# Patient Record
Sex: MALE | Race: WHITE | NOT HISPANIC OR LATINO | URBAN - NONMETROPOLITAN AREA
[De-identification: names, ages, dates, MRNs, and addresses within clinical notes are randomized per-mention and may not be internally consistent; named-entity substitution may affect disease eponyms.]

---

## 2023-05-04 PROBLEM — R42 VERTIGO: Status: ACTIVE | Noted: 2023-05-04

## 2023-05-04 PROBLEM — E78.2 MIXED HYPERLIPIDEMIA: Status: ACTIVE | Noted: 2023-05-04

## 2023-05-04 PROBLEM — I27.20 PULMONARY HYPERTENSION (MULTI): Status: ACTIVE | Noted: 2023-05-04

## 2023-05-04 PROBLEM — I51.89 GRADE I DIASTOLIC DYSFUNCTION: Status: ACTIVE | Noted: 2023-05-04

## 2023-05-04 PROBLEM — D86.0 PULMONARY SARCOIDOSIS (MULTI): Status: ACTIVE | Noted: 2023-05-04

## 2023-05-04 PROBLEM — K21.9 GASTROESOPHAGEAL REFLUX DISEASE WITHOUT ESOPHAGITIS: Status: ACTIVE | Noted: 2023-05-04

## 2023-05-04 RX ORDER — MECLIZINE HYDROCHLORIDE 25 MG/1
25 TABLET ORAL 2 TIMES DAILY PRN
COMMUNITY
End: 2023-05-05 | Stop reason: WASHOUT

## 2023-05-04 RX ORDER — ROSUVASTATIN CALCIUM 20 MG/1
40 TABLET, COATED ORAL DAILY
COMMUNITY
End: 2024-02-12 | Stop reason: WASHOUT

## 2023-05-04 RX ORDER — OMEPRAZOLE 20 MG/1
20 TABLET, DELAYED RELEASE ORAL
COMMUNITY

## 2023-05-04 RX ORDER — PROPRANOLOL HYDROCHLORIDE 20 MG/1
20 TABLET ORAL 2 TIMES DAILY
COMMUNITY
End: 2023-05-05 | Stop reason: WASHOUT

## 2023-05-05 ENCOUNTER — OFFICE VISIT (OUTPATIENT)
Dept: PRIMARY CARE | Facility: CLINIC | Age: 73
End: 2023-05-05
Payer: MEDICARE

## 2023-05-05 VITALS
TEMPERATURE: 98.6 F | HEIGHT: 74 IN | HEART RATE: 88 BPM | BODY MASS INDEX: 31.34 KG/M2 | DIASTOLIC BLOOD PRESSURE: 80 MMHG | WEIGHT: 244.2 LBS | SYSTOLIC BLOOD PRESSURE: 132 MMHG | OXYGEN SATURATION: 94 %

## 2023-05-05 DIAGNOSIS — I51.89 GRADE I DIASTOLIC DYSFUNCTION: Primary | ICD-10-CM

## 2023-05-05 DIAGNOSIS — R60.0 PEDAL EDEMA: ICD-10-CM

## 2023-05-05 PROCEDURE — 1036F TOBACCO NON-USER: CPT | Performed by: PHYSICIAN ASSISTANT

## 2023-05-05 PROCEDURE — 99214 OFFICE O/P EST MOD 30 MIN: CPT | Performed by: PHYSICIAN ASSISTANT

## 2023-05-05 PROCEDURE — 1159F MED LIST DOCD IN RCRD: CPT | Performed by: PHYSICIAN ASSISTANT

## 2023-05-05 PROCEDURE — 1160F RVW MEDS BY RX/DR IN RCRD: CPT | Performed by: PHYSICIAN ASSISTANT

## 2023-05-05 RX ORDER — HYDROCHLOROTHIAZIDE 12.5 MG/1
12.5 TABLET ORAL DAILY
Qty: 90 TABLET | Refills: 1 | Status: SHIPPED | OUTPATIENT
Start: 2023-05-05 | End: 2024-02-12 | Stop reason: WASHOUT

## 2023-05-05 RX ORDER — HYDROCHLOROTHIAZIDE 12.5 MG/1
12.5 TABLET ORAL DAILY
Qty: 30 TABLET | Refills: 0 | Status: SHIPPED | OUTPATIENT
Start: 2023-05-05 | End: 2023-05-05 | Stop reason: ENTERED-IN-ERROR

## 2023-05-05 ASSESSMENT — PATIENT HEALTH QUESTIONNAIRE - PHQ9
2. FEELING DOWN, DEPRESSED OR HOPELESS: NOT AT ALL
SUM OF ALL RESPONSES TO PHQ9 QUESTIONS 1 AND 2: 0
1. LITTLE INTEREST OR PLEASURE IN DOING THINGS: NOT AT ALL

## 2023-05-05 NOTE — PATIENT INSTRUCTIONS
Please follow up here in 3 months.     I will request records from [facility] for the labs and Pulm/Onc visits since September to review. Please keep an eye on your blood pressure and how you're feeling. If you feel dizzy or light headed, please stop the new medications hydrochlorothiazide and call the office for a visit and so we can check your BP and leg swelling    Prescriptions were sent to the pharmacy, please make sure to pick them up and call if there are any issues or questions.     Thank you for trusting me to be a part of your healthcare.    Take care! Aileen Downey PA-C

## 2023-05-05 NOTE — PROGRESS NOTES
Subjective   Patient ID: Abhilash Escamilla is a 73 y.o. male who presents for Edema (Leg swelling-bilateral ).    HPI Abhilash Escamilla is a 73 y.o. year old male patient with presenting to clinic with concern for   Chief Complaint   Patient presents with    Edema     Leg swelling-bilateral        Episodic vertigo , but rare and mild. Pt did not have relief from meclizine and saw ENT. Had relief with vertigo therapy and resolution last year.    Has noted mild pedal edema since last visit.  Also noted by his pulmonologist. Advised to wear compression stockings. Pt has noted some edema in his toes recently especially if he is not wearing the stockings.    He is compliant with his nightly oxygen but is doing much better than our last encounter and recently had a sleep study done in [place] to see if he can go off of home O2 at bedtime. His pulm surgeon no longer needs to monitor him and he is still following up with his oncology team from the tumor in his L upper lobe- PT states it was a cancerous lesion that was removed but he did not require chemo or radiation. Is being monitored by oncology team at [facility] and just had a CT chest within the past few weeks by Onc that showed no changes.    Patient Active Problem List   Diagnosis    Vertigo    Gastroesophageal reflux disease without esophagitis    Mixed hyperlipidemia    Pulmonary sarcoidosis (CMS/HCC)    Grade I diastolic dysfunction    Pulmonary hypertension (CMS/HCC)       Current Outpatient Medications:     omeprazole OTC (PriLOSEC OTC) 20 mg EC tablet, Take 1 tablet (20 mg) by mouth once daily in the morning. Take before meals. Do not crush, chew, or split., Disp: , Rfl:     oxygen (O2) therapy, Inhale 2 L/min at 120,000 mL/hr once daily at bedtime. via nasal canula  Sleep study shows hypoxia 88% overnight without sleep apnea. PFT no response to bronchodilator., Disp: , Rfl:     rosuvastatin (Crestor) 20 mg tablet, Take 2 tablets (40 mg) by mouth once daily., Disp: ,  "Rfl:     hydroCHLOROthiazide (HYDRODiuril) 12.5 mg tablet, Take 1 tablet (12.5 mg) by mouth once daily., Disp: 90 tablet, Rfl: 1  (hydrochlorothiazide added at today's visit)    Outside records reviewed at length- 51 pages reviewed in legacy EMR- documented key findings in problem list with page numbers.     Review of Systems  Review of Systems:   Constitutional: Denies fever  HEENT: Denies ST, earache  CVS: Denies Chest pain  Pulmonary: Denies wheezing, SOB  GI: Denies N/V  : Denies dysuria  Musculoskeletal:  Denies myalgia  Neuro: Denies focal weakness or numbness.  Skin: Denies Rashes.  *Review of Systems is negative unless otherwise mentioned in HPI or ROS above.    Objective   /80   Pulse 88   Temp 37 °C (98.6 °F)   Ht 1.88 m (6' 2\")   Wt 111 kg (244 lb 3.2 oz)   SpO2 94%   BMI 31.35 kg/m²     Physical Exam  Physical exam:  /80   Pulse 88   Temp 37 °C (98.6 °F)   Ht 1.88 m (6' 2\")   Wt 111 kg (244 lb 3.2 oz)   SpO2 94%   BMI 31.35 kg/m²  reviewed   Body mass index is 31.35 kg/m².     Constitutional: NAD.  Resting comfortably.  Head: Atraumatic, normocephalic.  ENT: moist oral mucosa. Posterior oropharynx nonerythematous with scant clear posterior pharyngeal streaking.  TM: Bilat TM nonerythematous, pearly grey, landmarks intact. EAC moderate non impacted cerumen.  Eyes: PERRLA. EOM intact.   Cardiac: Regular rate & rhythm. Mild diastolic murmur heard.  Pulmonary: Lungs clear bilat  GI: Soft, Nontender, nondistended.   Musculoskeletal: 1+ pitting bilat peripheral edema below knee bilat. DP and PT pulses intact bilat. Prominent varicosities bilat. No calf tenderness. Bilat neg subhash's sign. No cords.  Skin: No evidence of trauma. No rashes  Psych: Intact judgement and insight.    Assessment/Plan   Problem List Items Addressed This Visit       Grade I diastolic dysfunction - Primary    Relevant Medications    hydroCHLOROthiazide (HYDRODiuril) 12.5 mg tablet     Other Visit Diagnoses       " Pedal edema        Relevant Medications    hydroCHLOROthiazide (HYDRODiuril) 12.5 mg tablet

## 2023-07-17 ENCOUNTER — OFFICE VISIT (OUTPATIENT)
Dept: PRIMARY CARE | Facility: CLINIC | Age: 73
End: 2023-07-17
Payer: MEDICARE

## 2023-07-17 VITALS
TEMPERATURE: 98.6 F | HEIGHT: 74 IN | BODY MASS INDEX: 31.08 KG/M2 | WEIGHT: 242.2 LBS | DIASTOLIC BLOOD PRESSURE: 74 MMHG | SYSTOLIC BLOOD PRESSURE: 126 MMHG | HEART RATE: 88 BPM | OXYGEN SATURATION: 95 %

## 2023-07-17 DIAGNOSIS — E78.5 BORDERLINE HYPERLIPIDEMIA: ICD-10-CM

## 2023-07-17 DIAGNOSIS — Z12.5 SCREENING FOR PROSTATE CANCER: ICD-10-CM

## 2023-07-17 DIAGNOSIS — D86.0 PULMONARY SARCOIDOSIS (MULTI): ICD-10-CM

## 2023-07-17 DIAGNOSIS — Z00.00 ROUTINE GENERAL MEDICAL EXAMINATION AT A HEALTH CARE FACILITY: Primary | ICD-10-CM

## 2023-07-17 DIAGNOSIS — Z00.00 ROUTINE GENERAL MEDICAL EXAMINATION AT HEALTH CARE FACILITY: ICD-10-CM

## 2023-07-17 DIAGNOSIS — I27.20 PULMONARY HYPERTENSION (MULTI): ICD-10-CM

## 2023-07-17 DIAGNOSIS — E55.9 VITAMIN D INSUFFICIENCY: ICD-10-CM

## 2023-07-17 DIAGNOSIS — Z79.899 ON STATIN THERAPY: ICD-10-CM

## 2023-07-17 DIAGNOSIS — Z79.899 LONG-TERM CURRENT USE OF PROTON PUMP INHIBITOR THERAPY: ICD-10-CM

## 2023-07-17 PROCEDURE — G0439 PPPS, SUBSEQ VISIT: HCPCS | Performed by: PHYSICIAN ASSISTANT

## 2023-07-17 PROCEDURE — 1036F TOBACCO NON-USER: CPT | Performed by: PHYSICIAN ASSISTANT

## 2023-07-17 PROCEDURE — 1159F MED LIST DOCD IN RCRD: CPT | Performed by: PHYSICIAN ASSISTANT

## 2023-07-17 PROCEDURE — 1160F RVW MEDS BY RX/DR IN RCRD: CPT | Performed by: PHYSICIAN ASSISTANT

## 2023-07-17 PROCEDURE — 1170F FXNL STATUS ASSESSED: CPT | Performed by: PHYSICIAN ASSISTANT

## 2023-07-17 ASSESSMENT — ACTIVITIES OF DAILY LIVING (ADL)
TAKING_MEDICATION: INDEPENDENT
DRESSING: INDEPENDENT
MANAGING_FINANCES: INDEPENDENT
BATHING: INDEPENDENT
GROCERY_SHOPPING: INDEPENDENT
DOING_HOUSEWORK: INDEPENDENT

## 2023-07-17 NOTE — PROGRESS NOTES
Subjective     HPI   Abhilash Escamilla is a 73 y.o. year old male patient with presenting to clinic with concern for   Chief Complaint   Patient presents with    Medicare Annual Wellness Visit Subsequent       Mr Escamilla presents to clinic for Medicare annual.     CT chest scans done through [facility]- treated for lung ca 4/25/23  Aged out of colonoscopy  Annual labs due including PSA and lipids    Last PSA was August 2023 at [facility]      Previous visit:     Has noted mild pedal edema since last visit.  Also noted by his pulmonologist. Advised to wear compression stockings. Pt has noted some edema in his toes recently especially if he is not wearing the stockings. Please keep an eye on your blood pressure and how you're feeling. If you feel dizzy or light headed, please stop the new medications hydrochlorothiazide and call the office for a visit and so we can check your BP and leg swelling     Hx Episodic vertigo , but rare and mild. Pt did not have relief from meclizine and saw ENT. Had relief with vertigo therapy and resolution last year.     He is compliant with his nightly oxygen but is doing much better than our last encounter and recently had a sleep study done in [place] to see if he can go off of home O2 at bedtime. His pulm surgeon no longer needs to monitor him and he is still following up with his oncology team from the tumor in his L upper lobe- PT states it was a cancerous lesion that was removed but he did not require chemo or radiation. Is being monitored by oncology team at [facility] and just had a CT chest within the past few weeks by Onc that showed no changes.    Patient Active Problem List   Diagnosis    Vertigo    Gastroesophageal reflux disease without esophagitis    Mixed hyperlipidemia    Pulmonary sarcoidosis (CMS/HCC)    Grade I diastolic dysfunction    Pulmonary hypertension (CMS/HCC)       Past Medical History:   Diagnosis Date    Genital herpes       Past Surgical History:   Procedure Laterality Date     "CHOLECYSTECTOMY      CT LUNG MEDIASTINUM BIOPSY Left 07/22/2022    [place]- unknown result- sarcoidosis vs malignancy    LUNG LOBECTOMY Left     VATS and BRYCE resection 8/2022      Family History   Problem Relation Name Age of Onset    Breast cancer Parent        Social History     Tobacco Use    Smoking status: Former     Packs/day: 1.00     Years: 16.00     Total pack years: 16.00     Types: Cigarettes    Smokeless tobacco: Never   Substance Use Topics    Alcohol use: Not Currently     Alcohol/week: 5.0 standard drinks of alcohol     Types: 5 Standard drinks or equivalent per week     Comment: a few oz liquor every other day        Current Outpatient Medications:     hydroCHLOROthiazide (HYDRODiuril) 12.5 mg tablet, Take 1 tablet (12.5 mg) by mouth once daily., Disp: 90 tablet, Rfl: 1    omeprazole OTC (PriLOSEC OTC) 20 mg EC tablet, Take 1 tablet (20 mg) by mouth once daily in the morning. Take before meals. Do not crush, chew, or split., Disp: , Rfl:     oxygen (O2) therapy, Inhale 2 L/min at 120,000 mL/hr once daily at bedtime. via nasal canula  Sleep study shows hypoxia 88% overnight without sleep apnea. PFT no response to bronchodilator., Disp: , Rfl:     rosuvastatin (Crestor) 20 mg tablet, Take 2 tablets (40 mg) by mouth once daily., Disp: , Rfl:      Review of Systems  Review of Systems:   Constitutional: Denies fever  HEENT: Denies ST, earache  CVS: Denies Chest pain  Pulmonary: Denies wheezing, SOB  GI: Denies N/V  : Denies dysuria  Musculoskeletal:  Denies myalgia  Neuro: Denies focal weakness or numbness.  Skin: Denies Rashes.  *Review of Systems is negative unless otherwise mentioned in HPI or ROS above.    Objective   /50   Pulse 88   Temp 37 °C (98.6 °F)   Ht 1.88 m (6' 2\")   Wt 110 kg (242 lb 3.2 oz)   SpO2 95%   BMI 31.10 kg/m²     Physical Exam  Physical exam:  /50   Pulse 88   Temp 37 °C (98.6 °F)   Ht 1.88 m (6' 2\")   Wt 110 kg (242 lb 3.2 oz)   SpO2 95%   BMI 31.10 " kg/m²  reviewed   Body mass index is 31.1 kg/m².   Constitutional: NAD.  Resting comfortably.  Head: Atraumatic, normocephalic.  ENT: moist oral mucosa. Nasal mucosa mildly edematous and nonerythematous. Posterior oropharynx nonerythematous with mild clear posterior pharyngeal streaking.  TM: Bilat TM nonerythematous, pearly grey, landmarks intact. EAC wnl bilat.  Eyes: PERRLA. EOM intact.   Lymph: No anterior cervical chain lymphadenopathy or submandibular lymphadenopathy.   Cardiac: Regular rate & rhythm.   Pulmonary: Lungs clear bilat  GI: Soft, Nontender, nondistended.   Musculoskeletal: 1+ peripheral edema.   Skin: No evidence of trauma. No rashes  Psych: Intact judgement and insight.    .Assessment/Plan   Problem List Items Addressed This Visit       Pulmonary sarcoidosis (CMS/HCC)    Pulmonary hypertension (CMS/HCC)     Other Visit Diagnoses       Routine general medical examination at a health care facility    -  Primary    Borderline hyperlipidemia        Vitamin D insufficiency        Long-term current use of proton pump inhibitor therapy        On statin therapy

## 2023-08-07 ENCOUNTER — APPOINTMENT (OUTPATIENT)
Dept: PRIMARY CARE | Facility: CLINIC | Age: 73
End: 2023-08-07
Payer: MEDICARE

## 2023-08-28 ENCOUNTER — LAB (OUTPATIENT)
Dept: LAB | Facility: LAB | Age: 73
End: 2023-08-28
Payer: MEDICARE

## 2023-08-28 DIAGNOSIS — Z12.5 SCREENING FOR PROSTATE CANCER: ICD-10-CM

## 2023-08-28 DIAGNOSIS — E55.9 VITAMIN D INSUFFICIENCY: ICD-10-CM

## 2023-08-28 DIAGNOSIS — Z79.899 ON STATIN THERAPY: ICD-10-CM

## 2023-08-28 DIAGNOSIS — Z79.899 LONG-TERM CURRENT USE OF PROTON PUMP INHIBITOR THERAPY: ICD-10-CM

## 2023-08-28 DIAGNOSIS — E78.5 BORDERLINE HYPERLIPIDEMIA: ICD-10-CM

## 2023-08-28 DIAGNOSIS — Z00.00 ROUTINE GENERAL MEDICAL EXAMINATION AT A HEALTH CARE FACILITY: ICD-10-CM

## 2023-08-28 LAB
ALANINE AMINOTRANSFERASE (SGPT) (U/L) IN SER/PLAS: 15 U/L (ref 10–52)
ALBUMIN (G/DL) IN SER/PLAS: 4 G/DL (ref 3.4–5)
ALKALINE PHOSPHATASE (U/L) IN SER/PLAS: 94 U/L (ref 33–136)
ANION GAP IN SER/PLAS: 12 MMOL/L (ref 10–20)
ASPARTATE AMINOTRANSFERASE (SGOT) (U/L) IN SER/PLAS: 16 U/L (ref 9–39)
BILIRUBIN TOTAL (MG/DL) IN SER/PLAS: 1.4 MG/DL (ref 0–1.2)
CALCIDIOL (25 OH VITAMIN D3) (NG/ML) IN SER/PLAS: 25 NG/ML
CALCIUM (MG/DL) IN SER/PLAS: 8.8 MG/DL (ref 8.6–10.3)
CARBON DIOXIDE, TOTAL (MMOL/L) IN SER/PLAS: 27 MMOL/L (ref 21–32)
CHLORIDE (MMOL/L) IN SER/PLAS: 107 MMOL/L (ref 98–107)
CHOLESTEROL (MG/DL) IN SER/PLAS: 191 MG/DL (ref 0–199)
CHOLESTEROL IN HDL (MG/DL) IN SER/PLAS: 45.8 MG/DL
CHOLESTEROL/HDL RATIO: 4.2
CREATINE KINASE (U/L) IN SER/PLAS: 35 U/L (ref 0–325)
CREATININE (MG/DL) IN SER/PLAS: 1.18 MG/DL (ref 0.5–1.3)
ERYTHROCYTE DISTRIBUTION WIDTH (RATIO) BY AUTOMATED COUNT: 13.2 % (ref 11.5–14.5)
ERYTHROCYTE MEAN CORPUSCULAR HEMOGLOBIN CONCENTRATION (G/DL) BY AUTOMATED: 33 G/DL (ref 32–36)
ERYTHROCYTE MEAN CORPUSCULAR VOLUME (FL) BY AUTOMATED COUNT: 94 FL (ref 80–100)
ERYTHROCYTES (10*6/UL) IN BLOOD BY AUTOMATED COUNT: 5.03 X10E12/L (ref 4.5–5.9)
GFR MALE: 64 ML/MIN/1.73M2
GLUCOSE (MG/DL) IN SER/PLAS: 94 MG/DL (ref 74–99)
HEMATOCRIT (%) IN BLOOD BY AUTOMATED COUNT: 47.3 % (ref 41–52)
HEMOGLOBIN (G/DL) IN BLOOD: 15.6 G/DL (ref 13.5–17.5)
LDL: 122 MG/DL (ref 0–99)
LEUKOCYTES (10*3/UL) IN BLOOD BY AUTOMATED COUNT: 5 X10E9/L (ref 4.4–11.3)
MAGNESIUM (MG/DL) IN SER/PLAS: 1.77 MG/DL (ref 1.6–2.4)
PLATELETS (10*3/UL) IN BLOOD AUTOMATED COUNT: 268 X10E9/L (ref 150–450)
POTASSIUM (MMOL/L) IN SER/PLAS: 4.2 MMOL/L (ref 3.5–5.3)
PROSTATE SPECIFIC ANTIGEN,SCREEN: 0.86 NG/ML (ref 0–4)
PROTEIN TOTAL: 5.9 G/DL (ref 6.4–8.2)
SODIUM (MMOL/L) IN SER/PLAS: 142 MMOL/L (ref 136–145)
THYROTROPIN (MIU/L) IN SER/PLAS BY DETECTION LIMIT <= 0.05 MIU/L: 1.9 MIU/L (ref 0.44–3.98)
TRIGLYCERIDE (MG/DL) IN SER/PLAS: 117 MG/DL (ref 0–149)
UREA NITROGEN (MG/DL) IN SER/PLAS: 16 MG/DL (ref 6–23)
VLDL: 23 MG/DL (ref 0–40)

## 2023-08-28 PROCEDURE — 84443 ASSAY THYROID STIM HORMONE: CPT

## 2023-08-28 PROCEDURE — 83735 ASSAY OF MAGNESIUM: CPT

## 2023-08-28 PROCEDURE — 82550 ASSAY OF CK (CPK): CPT

## 2023-08-28 PROCEDURE — 85027 COMPLETE CBC AUTOMATED: CPT

## 2023-08-28 PROCEDURE — 80061 LIPID PANEL: CPT

## 2023-08-28 PROCEDURE — 80053 COMPREHEN METABOLIC PANEL: CPT

## 2023-08-28 PROCEDURE — 36415 COLL VENOUS BLD VENIPUNCTURE: CPT

## 2023-08-28 PROCEDURE — G0103 PSA SCREENING: HCPCS

## 2023-08-28 PROCEDURE — 82306 VITAMIN D 25 HYDROXY: CPT

## 2023-09-01 DIAGNOSIS — E80.6 BILIRUBINEMIA: Primary | ICD-10-CM

## 2023-09-01 DIAGNOSIS — R79.89 LOW VITAMIN D LEVEL: ICD-10-CM

## 2023-09-01 RX ORDER — CHOLECALCIFEROL (VITAMIN D3) 1250 MCG
50000 TABLET ORAL
Qty: 6 TABLET | Refills: 0 | Status: SHIPPED | OUTPATIENT
Start: 2023-09-01 | End: 2023-09-05 | Stop reason: ENTERED-IN-ERROR

## 2023-09-05 DIAGNOSIS — E55.9 VITAMIN D INSUFFICIENCY: Primary | ICD-10-CM

## 2023-09-05 RX ORDER — ASPIRIN 325 MG
50000 TABLET, DELAYED RELEASE (ENTERIC COATED) ORAL
Qty: 6 CAPSULE | Refills: 0 | Status: SHIPPED | OUTPATIENT
Start: 2023-09-05 | End: 2023-10-11

## 2023-11-07 ENCOUNTER — OFFICE VISIT (OUTPATIENT)
Dept: PRIMARY CARE | Facility: CLINIC | Age: 73
End: 2023-11-07
Payer: MEDICARE

## 2023-11-07 VITALS
OXYGEN SATURATION: 94 % | TEMPERATURE: 97 F | DIASTOLIC BLOOD PRESSURE: 82 MMHG | BODY MASS INDEX: 31.18 KG/M2 | HEART RATE: 73 BPM | SYSTOLIC BLOOD PRESSURE: 124 MMHG | WEIGHT: 243 LBS | HEIGHT: 74 IN

## 2023-11-07 DIAGNOSIS — R52 PAIN: ICD-10-CM

## 2023-11-07 DIAGNOSIS — K21.9 GASTROESOPHAGEAL REFLUX DISEASE WITHOUT ESOPHAGITIS: ICD-10-CM

## 2023-11-07 DIAGNOSIS — I51.89 GRADE I DIASTOLIC DYSFUNCTION: Primary | ICD-10-CM

## 2023-11-07 PROCEDURE — 1036F TOBACCO NON-USER: CPT | Performed by: PHYSICIAN ASSISTANT

## 2023-11-07 PROCEDURE — 1159F MED LIST DOCD IN RCRD: CPT | Performed by: PHYSICIAN ASSISTANT

## 2023-11-07 PROCEDURE — 99213 OFFICE O/P EST LOW 20 MIN: CPT | Performed by: PHYSICIAN ASSISTANT

## 2023-11-07 PROCEDURE — 1160F RVW MEDS BY RX/DR IN RCRD: CPT | Performed by: PHYSICIAN ASSISTANT

## 2023-11-07 RX ORDER — IBUPROFEN 600 MG/1
1 TABLET ORAL 4 TIMES DAILY
COMMUNITY
Start: 2021-02-22 | End: 2023-11-07 | Stop reason: SDUPTHER

## 2023-11-07 RX ORDER — IBUPROFEN 600 MG/1
600 TABLET ORAL AS NEEDED
Qty: 90 TABLET | Refills: 0 | Status: SHIPPED | OUTPATIENT
Start: 2023-11-07 | End: 2024-02-12 | Stop reason: WASHOUT

## 2023-11-07 NOTE — PROGRESS NOTES
Subjective     HPI   Abhilash Escamilla is a 73 y.o. year old male patient with presenting to clinic with concern for   Chief Complaint   Patient presents with    Follow-up     No concerns         Has noted mild pedal edema since last visit.  Also noted by his pulmonologist. Advised to wear compression stockings. He notes this is  chronic and unchanged. He has CHF stage 1 diastolic. Takes hydrochlorothiazide 12.5mg. Has pulmonary hypertension also.   Feels well using O2 overnight only.     Reviewed labs recently done by [facility].    Vertigo has not recurred. Hx Episodic vertigo , but rare and mild. Pt did not have relief from meclizine and saw ENT. Had relief with vertigo therapy and resolution last year.     He is compliant with his nightly oxygen but is doing much better than our last encounter and recently had a sleep study done in [place] to see if he can go off of home O2 at bedtime. His pulm surgeon no longer needs to monitor him and he is still following up with his oncology team from the tumor in his L upper lobe- PT states it was a cancerous lesion that was removed but he did not require chemo or radiation. Is being monitored by oncology team at [facility] and just had a CT chest within the past few weeks by Onc that showed no changes.      Patient Active Problem List   Diagnosis    Vertigo    Gastroesophageal reflux disease without esophagitis    Mixed hyperlipidemia    Pulmonary sarcoidosis (CMS/HCC)    Grade I diastolic dysfunction    Pulmonary hypertension (CMS/HCC)       Past Medical History:   Diagnosis Date    Genital herpes       Past Surgical History:   Procedure Laterality Date    CHOLECYSTECTOMY      CT LUNG MEDIASTINUM BIOPSY Left 07/22/2022    [place]- unknown result- sarcoidosis vs malignancy    LUNG LOBECTOMY Left     VATS and BRYCE resection 8/2022      Family History   Problem Relation Name Age of Onset    Breast cancer Parent        Social History     Tobacco Use    Smoking status: Former      "Packs/day: 1.00     Years: 16.00     Additional pack years: 0.00     Total pack years: 16.00     Types: Cigarettes    Smokeless tobacco: Never   Substance Use Topics    Alcohol use: Not Currently     Alcohol/week: 5.0 standard drinks of alcohol     Types: 5 Standard drinks or equivalent per week     Comment: a few oz liquor every other day        Current Outpatient Medications:     omeprazole OTC (PriLOSEC OTC) 20 mg EC tablet, Take 1 tablet (20 mg) by mouth once daily in the morning. Take before meals. Do not crush, chew, or split., Disp: , Rfl:     oxygen (O2) therapy, Inhale 2 L/min at 120,000 mL/hr once daily at bedtime. via nasal canula  Sleep study shows hypoxia 88% overnight without sleep apnea. PFT no response to bronchodilator., Disp: , Rfl:     rosuvastatin (Crestor) 20 mg tablet, Take 2 tablets (40 mg) by mouth once daily., Disp: , Rfl:     hydroCHLOROthiazide (HYDRODiuril) 12.5 mg tablet, Take 1 tablet (12.5 mg) by mouth once daily., Disp: 90 tablet, Rfl: 1    ibuprofen 600 mg tablet, Take 1 tablet (600 mg) by mouth if needed for mild pain (1 - 3)., Disp: 90 tablet, Rfl: 0     Review of Systems  Constitutional: Denies fever  HEENT: Denies ST, earache  CVS: Denies Chest pain  Pulmonary: Denies wheezing, SOB  GI: Denies N/V  : Denies dysuria  Musculoskeletal:  Denies myalgia  Neuro: Denies focal weakness or numbness.  Skin: Denies Rashes.  *Review of Systems is negative unless otherwise mentioned in HPI or ROS above.    Objective   /82   Pulse 73   Temp 36.1 °C (97 °F)   Ht 1.88 m (6' 2\")   Wt 110 kg (243 lb)   SpO2 94%   BMI 31.20 kg/m²  reviewed Body mass index is 31.2 kg/m².     Physical Exam  Constitutional: NAD.  Resting comfortably.  Head: Atraumatic, normocephalic.  ENT: Moist oral mucosa. Nasal mucosa wnl.   Cardiac: Regular rate & rhythm.   Pulmonary: Lungs clear bilat  GI: Soft, Nontender, nondistended.   Musculoskeletal: 1+ pitting peripheral edema bilat  Skin: No evidence of " trauma. No rashes  Psych: Intact judgement and insight.    .Assessment/Plan   Problem List Items Addressed This Visit       Gastroesophageal reflux disease without esophagitis    Grade I diastolic dysfunction - Primary     Other Visit Diagnoses       Pain        Relevant Medications    ibuprofen 600 mg tablet          Continue hydrochlorothiazide for CHF diastolic dysfunction. Pt is doing well on omeprazole. Continue.   Pt is doing well on rosuvastatin also.     Discussed O2 therapy and previous sleep study- O2 remains >90 with supplemental O2 and no CPAP

## 2024-02-12 ENCOUNTER — TELEMEDICINE (OUTPATIENT)
Dept: PRIMARY CARE | Facility: CLINIC | Age: 74
End: 2024-02-12
Payer: MEDICARE

## 2024-02-12 DIAGNOSIS — U07.1 COVID-19: Primary | ICD-10-CM

## 2024-02-12 PROCEDURE — 1159F MED LIST DOCD IN RCRD: CPT | Performed by: PHYSICIAN ASSISTANT

## 2024-02-12 PROCEDURE — 1036F TOBACCO NON-USER: CPT | Performed by: PHYSICIAN ASSISTANT

## 2024-02-12 PROCEDURE — 99213 OFFICE O/P EST LOW 20 MIN: CPT | Performed by: PHYSICIAN ASSISTANT

## 2024-02-12 RX ORDER — ATORVASTATIN CALCIUM 40 MG/1
1 TABLET, FILM COATED ORAL DAILY
COMMUNITY
Start: 2021-02-22

## 2024-02-12 NOTE — PATIENT INSTRUCTIONS
We discussed Paxlovid and the possibility of Lagevrio if pt is not eligible for Paxlovid.   Medication is recommended after consideration of your COVID status and risk factors including age, medical history, immunization status, severity of disease being mild to moderate, and duration of illness. We discussed the proper use of this medication in helping prevent severe illness from developing secondary to COVID including requiring hospitalization and ICU admission. You may have comorbidities that would make them a good candidate for medication treatment of COVID including consideration of PAXLOVID or Lagevrio.    PAXLOVID is currently the only FDA approved oral medication to treat COVID-19. There are several known medication interactions with Paxlovid discussed. PAXLOVID is used at a lower dose for people with severe chronic kidney disease. I will review the medication list in detail. The most common side effects are upset stomach/diarrhea, odd taste    Lagevrio is currently under FDA Emergency Use Authorization as of October 2023 and risk vs benefit of PAXLOVID vs Lagevrio vs supportive care was considered as discussed. regarding options including antiviral infusions and/or monoclonal antibody therapy which are no longer recommended due to inefficacy. The most common side effects are upset stomach with nausea and diarrhea, dizziness and headache.         Please stay well hydrated. Practice deep breathing often, walking in the home throughout course of illness and DVT prevention, and symptom management with tylenol/motrin as needed (unless you've been told to avoid NSAIDs) and cough medication.   Go to ER if emergent/urgent symptoms occur such as chest pain, shortness of breath, leg swelling &/or pain, confusion, dizziness, etc    Isolate per CDC guidelines for COVID illness unless further medical care if needed. Isolation guidelines as of Oct 2023 are:  5 days isolation at home then you can return to activities on  day 6-10 (masked) if improving symptoms and fever-free for 24 hours.     Quarantine of people you've been in contact with is no longer recommended regardless of their vaccination status.      PAXLOVID fact sheet  https://labeling.[employer].com/ShowLabeling.aspx?sy=33287&format=pdf    Lagevrio fact sheet https://www.fda.gov/media/214940/download

## 2024-02-12 NOTE — PROGRESS NOTES
An interactive audio and video telecommunication system which permits real time communications between the patient (at the originating site) and provider (at the distant site) was utilized to provide this telehealth service.   Verbal consent was requested and obtained from Abhilash Escamilla on this date, 02/12/24 for a telehealth visit.     Subjective    Abhilash Escamilla is a 74 y.o. year old male patient presenting for virtual visit   Chief Complaint   Patient presents with    Cough        COVID + yesterday. Headache, chills, fatigue, malaise, mild diarrhea.    He is compliant with his nightly oxygen but is doing much better than our last encounter and recently had a sleep study done in [place] to see if he can go off of home O2 at bedtime. His pulm surgeon no longer needs to monitor him and he is still following up with his oncology team from the tumor in his L upper lobe- PT states it was a cancerous lesion that was removed but he did not require chemo or radiation. Is being monitored by oncology team at [facility] and just had a CT chest within the past few weeks by Onc that showed no changes.    GFR has been over 60 for at least 3 years. Reviewed records at [facility] also. On atorvastatin    Patient Active Problem List   Diagnosis    Vertigo    Gastroesophageal reflux disease without esophagitis    Mixed hyperlipidemia    Pulmonary sarcoidosis (CMS/HCC)    Grade I diastolic dysfunction    Pulmonary hypertension (CMS/HCC)       Past Medical History:   Diagnosis Date    Genital herpes       Past Surgical History:   Procedure Laterality Date    CHOLECYSTECTOMY      CT LUNG MEDIASTINUM BIOPSY Left 07/22/2022    [place]- unknown result- sarcoidosis vs malignancy    LUNG LOBECTOMY Left     VATS and BRYCE resection 8/2022      Family History   Problem Relation Name Age of Onset    Breast cancer Parent        Social History     Tobacco Use    Smoking status: Former     Packs/day: 1.00     Years: 16.00     Additional pack years: 0.00      Total pack years: 16.00     Types: Cigarettes    Smokeless tobacco: Never   Substance Use Topics    Alcohol use: Not Currently     Alcohol/week: 5.0 standard drinks of alcohol     Types: 5 Standard drinks or equivalent per week     Comment: a few oz liquor every other day        Current Outpatient Medications:     atorvastatin (Lipitor) 40 mg tablet, Take 1 tablet (40 mg) by mouth once daily., Disp: , Rfl:     nirmatrelvir-ritonavir (PAXLOVID) 300 mg (150 mg x 2)-100 mg tablet therapy pack, Take 3 tablets by mouth 2 times a day for 5 days. STOP ATORVASTATIN for 8 days, Disp: 30 tablet, Rfl: 0    omeprazole OTC (PriLOSEC OTC) 20 mg EC tablet, Take 1 tablet (20 mg) by mouth once daily in the morning. Take before meals. Do not crush, chew, or split., Disp: , Rfl:     oxygen (O2) therapy, Inhale 2 L/min at 120,000 mL/hr once daily at bedtime. via nasal canula  Sleep study shows hypoxia 88% overnight without sleep apnea. PFT no response to bronchodilator., Disp: , Rfl:      Review of Systems    Review of Systems:   Constitutional: Denies fever  HEENT: Denies ST, earache  CVS: Denies Chest pain  Pulmonary: Denies wheezing, SOB  GI: Denies N/V  : Denies dysuria  Musculoskeletal:  Denies myalgia  Neuro: Denies focal weakness or numbness.  Skin: Denies Rashes.  *Review of Systems is negative unless otherwise mentioned in HPI or ROS above.     Objective    VITALS  Pt does not have vitals available at time of visit.    Exam       Limited physical exam in virtual platform  Nontoxic. No acute distress.  Nonlabored breathing.    Assessment/Plan      Problem List Items Addressed This Visit    None  Visit Diagnoses       COVID-19    -  Primary    Relevant Medications    nirmatrelvir-ritonavir (PAXLOVID) 300 mg (150 mg x 2)-100 mg tablet therapy pack             DISCHARGE    Please schedule a follow up visit     Any prescriptions were sent to the pharmacy, please make sure to pick them up and call if there are any issues or  questions.     Thank you for trusting me to be a part of your healthcare.    Take care!     Aileen Downey PA-C  [facility]  [phone] [phone]     Please feel free to call the office, or return a message if you have any questions or concerns.

## 2024-03-12 ENCOUNTER — TELEPHONE (OUTPATIENT)
Dept: PRIMARY CARE | Facility: CLINIC | Age: 74
End: 2024-03-12
Payer: MEDICARE

## 2024-03-12 NOTE — TELEPHONE ENCOUNTER
Was told by his eye doctor that he has cataracts that may require surgery, do you know who he should follow up with for this?  Do you recommend anyone?

## 2024-03-28 ENCOUNTER — TELEPHONE (OUTPATIENT)
Dept: PRIMARY CARE | Facility: CLINIC | Age: 74
End: 2024-03-28
Payer: MEDICARE

## 2024-03-28 DIAGNOSIS — B35.1 ONYCHOMYCOSIS OF GREAT TOE: Primary | ICD-10-CM

## 2024-03-28 RX ORDER — CICLOPIROX 80 MG/ML
1 SOLUTION TOPICAL NIGHTLY
Qty: 6.6 ML | Refills: 5 | Status: SHIPPED | OUTPATIENT
Start: 2024-03-28 | End: 2024-09-24

## 2024-05-07 ENCOUNTER — OFFICE VISIT (OUTPATIENT)
Dept: PRIMARY CARE | Facility: CLINIC | Age: 74
End: 2024-05-07
Payer: MEDICARE

## 2024-05-07 VITALS
BODY MASS INDEX: 30.88 KG/M2 | WEIGHT: 240.6 LBS | OXYGEN SATURATION: 95 % | DIASTOLIC BLOOD PRESSURE: 62 MMHG | TEMPERATURE: 98.6 F | SYSTOLIC BLOOD PRESSURE: 132 MMHG | HEART RATE: 92 BPM | HEIGHT: 74 IN

## 2024-05-07 DIAGNOSIS — C34.92 NON-SMALL CELL CANCER OF LEFT LUNG (MULTI): ICD-10-CM

## 2024-05-07 DIAGNOSIS — I27.20 PULMONARY HYPERTENSION (MULTI): ICD-10-CM

## 2024-05-07 DIAGNOSIS — Z00.00 MEDICARE ANNUAL WELLNESS VISIT, SUBSEQUENT: Primary | ICD-10-CM

## 2024-05-07 DIAGNOSIS — D86.0 PULMONARY SARCOIDOSIS (MULTI): ICD-10-CM

## 2024-05-07 DIAGNOSIS — Z01.818 PREOPERATIVE EXAMINATION: ICD-10-CM

## 2024-05-07 PROCEDURE — 1160F RVW MEDS BY RX/DR IN RCRD: CPT | Performed by: PHYSICIAN ASSISTANT

## 2024-05-07 PROCEDURE — 1124F ACP DISCUSS-NO DSCNMKR DOCD: CPT | Performed by: PHYSICIAN ASSISTANT

## 2024-05-07 PROCEDURE — 1170F FXNL STATUS ASSESSED: CPT | Performed by: PHYSICIAN ASSISTANT

## 2024-05-07 PROCEDURE — G0439 PPPS, SUBSEQ VISIT: HCPCS | Performed by: PHYSICIAN ASSISTANT

## 2024-05-07 PROCEDURE — 1036F TOBACCO NON-USER: CPT | Performed by: PHYSICIAN ASSISTANT

## 2024-05-07 PROCEDURE — 1159F MED LIST DOCD IN RCRD: CPT | Performed by: PHYSICIAN ASSISTANT

## 2024-05-07 ASSESSMENT — PATIENT HEALTH QUESTIONNAIRE - PHQ9
2. FEELING DOWN, DEPRESSED OR HOPELESS: NOT AT ALL
1. LITTLE INTEREST OR PLEASURE IN DOING THINGS: NOT AT ALL
SUM OF ALL RESPONSES TO PHQ9 QUESTIONS 1 AND 2: 0

## 2024-05-07 ASSESSMENT — ACTIVITIES OF DAILY LIVING (ADL)
DOING_HOUSEWORK: INDEPENDENT
MANAGING_FINANCES: INDEPENDENT
BATHING: INDEPENDENT
DRESSING: INDEPENDENT
GROCERY_SHOPPING: INDEPENDENT
TAKING_MEDICATION: INDEPENDENT

## 2024-05-07 NOTE — PROGRESS NOTES
Subjective   HPI   Abhilash Escamilla is a 74 y.o. year old male patient with presenting to clinic with concern for Medicare Visit     Chief Complaint   Patient presents with    Medicare Annual Wellness Visit Subsequent    Procedure     Clearance. Cataract. Dr. Viktor Bedolla. [place]. Right on 05-13, Left on 05-20.       Medicare annual visit.  Also needs surgical clearance for cataract surgery    Advance Care Planning    Advance Care Planning was discussed with patientadvanced directives and POA and the patient's Advance Care Plan is documented in the EMR.    Labs reviewed 3/25/24 from Seymour Pritchett MD - 03/08/2024 10:10 AM EST   transbronchial biopsy diagnosis of sarcoidosis at [facility] 20 years ago. CT last year with new left upper lobe nodule than biopsy was consistent with squamous cell cancer. He was status post resection with Dr. Moseley. He notes some restriction but still able to do more slowly gaining strength and exercising. CT scan done today prior to his visit by my review shows no change in his nodules final report pending from cardiology. Left hilum seems to be better expanded     74-year-old male referral from Dr. Haro for lung mass.  Followed by pulmonary for several years for sarcoidosis with yearly CT imaging.   CT chest 5/4/22 - mediastinal and hilar adenopathy mostly calcified and is stable, BRYCE mass 3.7 x 2.1 x 2.1 cm.    PET CT 6/15/22 - maximum SUV in lymph nodes is SUV 4.2 in paraesophageal LN and SUV 4.4 in right perihilum, SUV of the BRYCE mass is 2.7, gastrohepatic LN with SUV 3.9.     Brain MRI 6/15/22 negative for metastatic lesions.     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 Procedure c/b pneumothorax managed with pigtail placement.     PFT's from 2020 showed FEV1 of 3.18 L, 98%; DLCO 65%.    The patient is fully funcitonal.     PMHx/PSHx/Meds: Sarcoidosis, BMI 30, lap ирина, hip replacement, knee  replacement, 23 pack/year smoker quit 30 years ago, 1 ETOH drink a day.     Diagnosis ICD-10-CM Plan  1. Preop exam for internal medicine Z01.818    2. Medication monitoring encounter Z51.81    3. Hyperglycemia R73.9    4. Vitamin D deficiency E55.9    5. Mixed hyperlipidemia E78.2    6. Benign essential tremor G25.0    7. S/P TKR (total knee replacement), right Z96.651    8. Sarcoidosis D86.9    9. Benign positional vertigo, right H81.11    10. Non-small cell cancer of left lung (HCC) C34.92    11. Edema, unspecified type R60.9 (1+ pitting bilat KG_)    12. Stage 3a chronic kidney disease (HCC) N18.31    13. Age-related cataract of both eyes, unspecified age-related cataract type H25.9    14. Nocturnal hypoxia G47.34 (resolved, sleeping all night)      Based on the patient's history, physical examination, laboratory studies and EKG I find him at acceptable risk to proceed with cataract surgery.    Please do not hesitate to contact our office with any questions or concerns.    .  Electronically signed by Ken Eric MD at 04/01/2024 1:27 PM EDT     Patient Care Team:  Aileen Downey PA-C as PCP - General     Specialists    Past Medical, Surgical, and Family History reviewed and updated in chart.    Reviewed all medications by prescribing practitioner or clinical pharmacist (such as prescriptions, OTCs, herbal therapies and supplements) and documented in the medical record.     Preventative Health   Health Maintenance Due   Topic Date Due    Hepatitis C Screening  Never done    DTaP/Tdap/Td Vaccines (2 - Td or Tdap) 04/19/2022    Pneumococcal Vaccination (3 of 3 - PPSV23 or PCV20) 09/05/2022            Topic Date Due    DTaP/Tdap/Td Vaccines (2 - Td or Tdap) 04/19/2022        Immunizations Reviewed  Immunization History   Administered Date(s) Administered    Flu vaccine, quadrivalent, high-dose, preservative free, age 65y+ (FLUZONE) 10/25/2021, 10/05/2022    Moderna COVID-19 vaccine, bivalent, blue  cap/gray label *Check age/dose* 10/08/2022    Moderna SARS-CoV-2 Vaccination 12/13/2021        Problem List Reviewed  Patient Active Problem List   Diagnosis    Vertigo    Gastroesophageal reflux disease without esophagitis    Mixed hyperlipidemia    Pulmonary sarcoidosis (Multi)    Grade I diastolic dysfunction    Pulmonary hypertension (Multi)       Past Medical History:   Diagnosis Date    Genital herpes       Past Surgical History:   Procedure Laterality Date    CHOLECYSTECTOMY      CT LUNG MEDIASTINUM BIOPSY Left 07/22/2022    [place]- unknown result- sarcoidosis vs malignancy    LUNG LOBECTOMY Left     VATS and BRYCE resection 8/2022      Family History   Problem Relation Name Age of Onset    Breast cancer Parent        Social History     Tobacco Use    Smoking status: Former     Current packs/day: 1.00     Average packs/day: 1 pack/day for 16.0 years (16.0 ttl pk-yrs)     Types: Cigarettes    Smokeless tobacco: Never    Tobacco comments:     Quit 1990   Substance Use Topics    Alcohol use: Not Currently     Alcohol/week: 5.0 standard drinks of alcohol     Types: 5 Standard drinks or equivalent per week     Comment: a few oz liquor every other day        Medications Reviewed  Current Outpatient Medications on File Prior to Visit   Medication Sig Dispense Refill    atorvastatin (Lipitor) 40 mg tablet Take 1 tablet (40 mg) by mouth once daily.      ciclopirox (Penlac) 8 % solution Apply 1 Application topically once daily at bedtime. Remove with alcohol every 7 days; continue therapy until nail clearance 6.6 mL 5    omeprazole OTC (PriLOSEC OTC) 20 mg EC tablet Take 1 tablet (20 mg) by mouth once daily in the morning. Take before meals. Do not crush, chew, or split.      oxygen (O2) therapy Inhale 2 L/min at 120,000 mL/hr once daily at bedtime. via nasal canula    Sleep study shows hypoxia 88% overnight without sleep apnea.  PFT no response to bronchodilator.       No current facility-administered medications on  "file prior to visit.        Review of Systems  Constitutional: Denies fever  HEENT: Denies ST, earache  CVS: Denies Chest pain  Pulmonary: Denies wheezing, SOB  GI: Denies N/V  : Denies dysuria  Musculoskeletal:  Denies myalgia  Neuro: Denies focal weakness or numbness.  Skin: Denies Rashes.  *Review of Systems is negative unless otherwise mentioned in HPI or ROS above.      @OBJECTIVEBEGIN  /62   Pulse 92   Temp 37 °C (98.6 °F)   Ht 1.88 m (6' 2\")   Wt 109 kg (240 lb 9.6 oz)   SpO2 95%   BMI 30.89 kg/m²  reviewed Body mass index is 30.89 kg/m².     Physical Exam  Constitutional: NAD. Afebrile. Resting comfortably.  ENT: Nasal mucosa and oropharynx: moist oral mucosa. Posterior oropharynx nonerythematous. No posterior pharyngeal streaking.  Eyes: PERRLA. EOM intact. No vertical or circular nystagmus.  Lymph: No anterior cervical chain or submandibular lymphadenopathy. No sentinel lymph nodes.  Cardiac: Regular rate & rhythm. No murmur, gallops, or rubs.  Pulmonary: Lungs clear to auscultation bilaterally with good aeration. No wheezes, rhonchi, or rales. Normal WOB.  GI: Soft, Nontender, nondistended. No guarding. Normal BS x4.  : No suprapubic tenderness. No CVA tenderness to percussion.   Musculoskeletal: No peripheral edema.   Skin: No evidence of trauma. No rashes  Neuro: No focal neuro deficits. Normal gait without assistive devices.  Psych: Intact judgement and insight.    MDM  Medically Cleared for cataract surgery. Form completed.      .Assessment/Plan   Problem List Items Addressed This Visit             ICD-10-CM    Pulmonary sarcoidosis (Multi) D86.0      Still being followed by team in [facility]. Dr. Seymour Viramontes  Dx at [facility] [address]  PET scan pg 44 of old records scanned in legacy EMR showing uptake in 2 areas of lung malignancy vs sarcoidosis. Biopsy done, no results present in the records received.  PFTs 9/2020 showed no response to bronchidilators p 33 old " records legacy EMR  Continue O2 overnight NC. Pt states that he had a sleep study and O2 overnight indicated for SpO2 ~88%. He reports that he did not need CPAP.         Pulmonary hypertension (Multi) I27.20      Still being followed by team in [facility]. Dr. Seymour Viramontes  Dx at [facility]  PET scan pg 44 of old records scanned in legacy EMR showing uptake in 2 areas of lung malignancy vs sarcoidosis. Biopsy done, no results present in the records received.  PFTs 9/2020 showed no response to bronchidilators p 33 old records legacy EMR  Continue O2 overnight NC. Pt states that he had a sleep study and O2 overnight indicated for SpO2 ~88%. He reports that he did not need CPAP.          Other Visit Diagnoses         Codes    Medicare annual wellness visit, subsequent    -  Primary Z00.00    Preoperative examination     Z01.818

## 2024-05-07 NOTE — ASSESSMENT & PLAN NOTE
Still being followed by team in [facility]. Dr. Seymour Viramontes  Dx at [facility]  PET scan pg 44 of old records scanned in legacy EMR showing uptake in 2 areas of lung malignancy vs sarcoidosis. Biopsy done, no results present in the records received.  PFTs 9/2020 showed no response to bronchidilators p 33 old records legacy EMR  Continue O2 overnight NC. Pt states that he had a sleep study and O2 overnight indicated for SpO2 ~88%. He reports that he did not need CPAP.

## 2024-05-07 NOTE — ASSESSMENT & PLAN NOTE
Still being followed by team in [facility]. Dr. Seymour Viramontes  Dx at [facility]  PET scan pg 44 of old records scanned in legacy EMR showing uptake in 2 areas of lung malignancy vs sarcoidosis. Biopsy done, no results present in the records received.  PFTs 9/2020 showed no response to bronchidilators p 33 old records legacy EMR  Continue O2 overnight NC. Pt states that he had a sleep study and O2 overnight indicated for SpO2 ~88%. He reports that he did not need CPAP.

## 2024-09-15 ENCOUNTER — APPOINTMENT (OUTPATIENT)
Dept: RADIOLOGY | Facility: HOSPITAL | Age: 74
End: 2024-09-15
Payer: MEDICARE

## 2024-09-15 ENCOUNTER — APPOINTMENT (OUTPATIENT)
Dept: CARDIOLOGY | Facility: HOSPITAL | Age: 74
End: 2024-09-15
Payer: MEDICARE

## 2024-09-15 ENCOUNTER — OFFICE VISIT (OUTPATIENT)
Dept: URGENT CARE | Age: 74
End: 2024-09-15
Payer: MEDICARE

## 2024-09-15 ENCOUNTER — CLINICAL SUPPORT (OUTPATIENT)
Dept: URGENT CARE | Age: 74
End: 2024-09-15
Payer: MEDICARE

## 2024-09-15 ENCOUNTER — HOSPITAL ENCOUNTER (OUTPATIENT)
Facility: HOSPITAL | Age: 74
Setting detail: OBSERVATION
Discharge: HOME | End: 2024-09-16
Attending: EMERGENCY MEDICINE | Admitting: INTERNAL MEDICINE
Payer: MEDICARE

## 2024-09-15 VITALS
SYSTOLIC BLOOD PRESSURE: 160 MMHG | DIASTOLIC BLOOD PRESSURE: 71 MMHG | TEMPERATURE: 97.6 F | HEIGHT: 72 IN | HEART RATE: 63 BPM | OXYGEN SATURATION: 95 % | WEIGHT: 234 LBS | BODY MASS INDEX: 31.69 KG/M2 | RESPIRATION RATE: 16 BRPM

## 2024-09-15 DIAGNOSIS — I50.9 ACUTE CONGESTIVE HEART FAILURE, UNSPECIFIED HEART FAILURE TYPE: Primary | ICD-10-CM

## 2024-09-15 DIAGNOSIS — R06.02 SOB (SHORTNESS OF BREATH): ICD-10-CM

## 2024-09-15 DIAGNOSIS — R06.02 SOB (SHORTNESS OF BREATH): Primary | ICD-10-CM

## 2024-09-15 DIAGNOSIS — J90 BILATERAL PLEURAL EFFUSION: ICD-10-CM

## 2024-09-15 DIAGNOSIS — I50.23 ACUTE ON CHRONIC SYSTOLIC HEART FAILURE: ICD-10-CM

## 2024-09-15 DIAGNOSIS — R06.02 SHORTNESS OF BREATH: ICD-10-CM

## 2024-09-15 DIAGNOSIS — R06.02 SHORTNESS OF BREATH ON EXERTION: ICD-10-CM

## 2024-09-15 PROBLEM — R09.89 SYMPTOM OF CONGESTIVE HEART FAILURE: Status: ACTIVE | Noted: 2024-09-15

## 2024-09-15 LAB
ALBUMIN SERPL BCP-MCNC: 3.8 G/DL (ref 3.4–5)
ALP SERPL-CCNC: 69 U/L (ref 33–136)
ALT SERPL W P-5'-P-CCNC: 11 U/L (ref 10–52)
ANION GAP SERPL CALC-SCNC: 12 MMOL/L (ref 10–20)
AST SERPL W P-5'-P-CCNC: 14 U/L (ref 9–39)
BASOPHILS # BLD AUTO: 0.05 X10*3/UL (ref 0–0.1)
BASOPHILS NFR BLD AUTO: 1 %
BILIRUB SERPL-MCNC: 1.1 MG/DL (ref 0–1.2)
BNP SERPL-MCNC: 1032 PG/ML (ref 0–99)
BUN SERPL-MCNC: 17 MG/DL (ref 6–23)
CALCIUM SERPL-MCNC: 8.9 MG/DL (ref 8.6–10.3)
CARDIAC TROPONIN I PNL SERPL HS: 28 NG/L (ref 0–20)
CARDIAC TROPONIN I PNL SERPL HS: 29 NG/L (ref 0–20)
CARDIAC TROPONIN I PNL SERPL HS: 33 NG/L (ref 0–20)
CHLORIDE SERPL-SCNC: 108 MMOL/L (ref 98–107)
CO2 SERPL-SCNC: 26 MMOL/L (ref 21–32)
CREAT SERPL-MCNC: 1.2 MG/DL (ref 0.5–1.3)
EGFRCR SERPLBLD CKD-EPI 2021: 63 ML/MIN/1.73M*2
EOSINOPHIL # BLD AUTO: 0.28 X10*3/UL (ref 0–0.4)
EOSINOPHIL NFR BLD AUTO: 5.5 %
ERYTHROCYTE [DISTWIDTH] IN BLOOD BY AUTOMATED COUNT: 13.2 % (ref 11.5–14.5)
FLUAV RNA RESP QL NAA+PROBE: NOT DETECTED
FLUBV RNA RESP QL NAA+PROBE: NOT DETECTED
GLUCOSE SERPL-MCNC: 143 MG/DL (ref 74–99)
HCT VFR BLD AUTO: 45.1 % (ref 41–52)
HGB BLD-MCNC: 15.1 G/DL (ref 13.5–17.5)
IMM GRANULOCYTES # BLD AUTO: 0.01 X10*3/UL (ref 0–0.5)
IMM GRANULOCYTES NFR BLD AUTO: 0.2 % (ref 0–0.9)
LYMPHOCYTES # BLD AUTO: 0.44 X10*3/UL (ref 0.8–3)
LYMPHOCYTES NFR BLD AUTO: 8.6 %
MCH RBC QN AUTO: 30.9 PG (ref 26–34)
MCHC RBC AUTO-ENTMCNC: 33.5 G/DL (ref 32–36)
MCV RBC AUTO: 92 FL (ref 80–100)
MONOCYTES # BLD AUTO: 0.69 X10*3/UL (ref 0.05–0.8)
MONOCYTES NFR BLD AUTO: 13.5 %
NEUTROPHILS # BLD AUTO: 3.64 X10*3/UL (ref 1.6–5.5)
NEUTROPHILS NFR BLD AUTO: 71.2 %
NRBC BLD-RTO: 0 /100 WBCS (ref 0–0)
PLATELET # BLD AUTO: 219 X10*3/UL (ref 150–450)
POTASSIUM SERPL-SCNC: 3.8 MMOL/L (ref 3.5–5.3)
PROT SERPL-MCNC: 6.1 G/DL (ref 6.4–8.2)
RBC # BLD AUTO: 4.89 X10*6/UL (ref 4.5–5.9)
SARS-COV-2 RNA RESP QL NAA+PROBE: NOT DETECTED
SODIUM SERPL-SCNC: 142 MMOL/L (ref 136–145)
WBC # BLD AUTO: 5.1 X10*3/UL (ref 4.4–11.3)

## 2024-09-15 PROCEDURE — 99223 1ST HOSP IP/OBS HIGH 75: CPT

## 2024-09-15 PROCEDURE — 96374 THER/PROPH/DIAG INJ IV PUSH: CPT | Mod: 59

## 2024-09-15 PROCEDURE — 2500000001 HC RX 250 WO HCPCS SELF ADMINISTERED DRUGS (ALT 637 FOR MEDICARE OP)

## 2024-09-15 PROCEDURE — 96376 TX/PRO/DX INJ SAME DRUG ADON: CPT | Mod: 59

## 2024-09-15 PROCEDURE — 93005 ELECTROCARDIOGRAM TRACING: CPT

## 2024-09-15 PROCEDURE — 84484 ASSAY OF TROPONIN QUANT: CPT | Performed by: EMERGENCY MEDICINE

## 2024-09-15 PROCEDURE — G0378 HOSPITAL OBSERVATION PER HR: HCPCS

## 2024-09-15 PROCEDURE — 2550000001 HC RX 255 CONTRASTS: Performed by: EMERGENCY MEDICINE

## 2024-09-15 PROCEDURE — 84484 ASSAY OF TROPONIN QUANT: CPT

## 2024-09-15 PROCEDURE — 71275 CT ANGIOGRAPHY CHEST: CPT

## 2024-09-15 PROCEDURE — 85025 COMPLETE CBC W/AUTO DIFF WBC: CPT | Performed by: EMERGENCY MEDICINE

## 2024-09-15 PROCEDURE — 36415 COLL VENOUS BLD VENIPUNCTURE: CPT | Performed by: EMERGENCY MEDICINE

## 2024-09-15 PROCEDURE — 2500000004 HC RX 250 GENERAL PHARMACY W/ HCPCS (ALT 636 FOR OP/ED): Performed by: EMERGENCY MEDICINE

## 2024-09-15 PROCEDURE — 2500000004 HC RX 250 GENERAL PHARMACY W/ HCPCS (ALT 636 FOR OP/ED)

## 2024-09-15 PROCEDURE — 80053 COMPREHEN METABOLIC PANEL: CPT | Performed by: EMERGENCY MEDICINE

## 2024-09-15 PROCEDURE — 83880 ASSAY OF NATRIURETIC PEPTIDE: CPT | Performed by: EMERGENCY MEDICINE

## 2024-09-15 PROCEDURE — 99285 EMERGENCY DEPT VISIT HI MDM: CPT | Mod: 25

## 2024-09-15 PROCEDURE — 87636 SARSCOV2 & INF A&B AMP PRB: CPT | Performed by: EMERGENCY MEDICINE

## 2024-09-15 PROCEDURE — 94760 N-INVAS EAR/PLS OXIMETRY 1: CPT

## 2024-09-15 RX ORDER — SODIUM CHLORIDE 9 MG/ML
10 INJECTION, SOLUTION INTRAVENOUS CONTINUOUS PRN
Status: DISCONTINUED | OUTPATIENT
Start: 2024-09-15 | End: 2024-09-16 | Stop reason: HOSPADM

## 2024-09-15 RX ORDER — ACETAMINOPHEN 650 MG/1
650 SUPPOSITORY RECTAL EVERY 4 HOURS PRN
Status: DISCONTINUED | OUTPATIENT
Start: 2024-09-15 | End: 2024-09-16

## 2024-09-15 RX ORDER — TALC
6 POWDER (GRAM) TOPICAL NIGHTLY PRN
Status: DISCONTINUED | OUTPATIENT
Start: 2024-09-15 | End: 2024-09-16 | Stop reason: HOSPADM

## 2024-09-15 RX ORDER — ACETAMINOPHEN 325 MG/1
650 TABLET ORAL EVERY 4 HOURS PRN
Status: DISCONTINUED | OUTPATIENT
Start: 2024-09-15 | End: 2024-09-16

## 2024-09-15 RX ORDER — GUAIFENESIN 600 MG/1
600 TABLET, EXTENDED RELEASE ORAL EVERY 12 HOURS PRN
Status: DISCONTINUED | OUTPATIENT
Start: 2024-09-15 | End: 2024-09-16 | Stop reason: HOSPADM

## 2024-09-15 RX ORDER — ATORVASTATIN CALCIUM 40 MG/1
40 TABLET, FILM COATED ORAL NIGHTLY
Status: DISCONTINUED | OUTPATIENT
Start: 2024-09-15 | End: 2024-09-16 | Stop reason: HOSPADM

## 2024-09-15 RX ORDER — ACETAMINOPHEN 160 MG/5ML
650 SOLUTION ORAL EVERY 4 HOURS PRN
Status: DISCONTINUED | OUTPATIENT
Start: 2024-09-15 | End: 2024-09-16

## 2024-09-15 RX ORDER — PANTOPRAZOLE SODIUM 40 MG/1
40 TABLET, DELAYED RELEASE ORAL
Status: DISCONTINUED | OUTPATIENT
Start: 2024-09-16 | End: 2024-09-16 | Stop reason: HOSPADM

## 2024-09-15 RX ORDER — ONDANSETRON HYDROCHLORIDE 2 MG/ML
4 INJECTION, SOLUTION INTRAVENOUS EVERY 8 HOURS PRN
Status: DISCONTINUED | OUTPATIENT
Start: 2024-09-15 | End: 2024-09-16 | Stop reason: HOSPADM

## 2024-09-15 RX ORDER — POLYETHYLENE GLYCOL 3350 17 G/17G
17 POWDER, FOR SOLUTION ORAL DAILY
Status: DISCONTINUED | OUTPATIENT
Start: 2024-09-15 | End: 2024-09-16 | Stop reason: HOSPADM

## 2024-09-15 RX ORDER — PANTOPRAZOLE SODIUM 40 MG/10ML
40 INJECTION, POWDER, LYOPHILIZED, FOR SOLUTION INTRAVENOUS
Status: DISCONTINUED | OUTPATIENT
Start: 2024-09-16 | End: 2024-09-16 | Stop reason: HOSPADM

## 2024-09-15 RX ORDER — FUROSEMIDE 10 MG/ML
20 INJECTION INTRAMUSCULAR; INTRAVENOUS ONCE
Status: COMPLETED | OUTPATIENT
Start: 2024-09-15 | End: 2024-09-15

## 2024-09-15 RX ORDER — ONDANSETRON 4 MG/1
4 TABLET, ORALLY DISINTEGRATING ORAL EVERY 8 HOURS PRN
Status: DISCONTINUED | OUTPATIENT
Start: 2024-09-15 | End: 2024-09-16 | Stop reason: HOSPADM

## 2024-09-15 RX ORDER — GUAIFENESIN/DEXTROMETHORPHAN 100-10MG/5
5 SYRUP ORAL EVERY 4 HOURS PRN
Status: DISCONTINUED | OUTPATIENT
Start: 2024-09-15 | End: 2024-09-16 | Stop reason: HOSPADM

## 2024-09-15 RX ORDER — ENOXAPARIN SODIUM 100 MG/ML
40 INJECTION SUBCUTANEOUS EVERY 24 HOURS
Status: DISCONTINUED | OUTPATIENT
Start: 2024-09-15 | End: 2024-09-16 | Stop reason: HOSPADM

## 2024-09-15 RX ORDER — FUROSEMIDE 10 MG/ML
20 INJECTION INTRAMUSCULAR; INTRAVENOUS 2 TIMES DAILY
Status: DISCONTINUED | OUTPATIENT
Start: 2024-09-15 | End: 2024-09-16

## 2024-09-15 SDOH — SOCIAL STABILITY: SOCIAL INSECURITY: ABUSE: ADULT

## 2024-09-15 SDOH — SOCIAL STABILITY: SOCIAL INSECURITY: WERE YOU ABLE TO COMPLETE ALL THE BEHAVIORAL HEALTH SCREENINGS?: YES

## 2024-09-15 SDOH — SOCIAL STABILITY: SOCIAL INSECURITY: DO YOU FEEL UNSAFE GOING BACK TO THE PLACE WHERE YOU ARE LIVING?: NO

## 2024-09-15 SDOH — SOCIAL STABILITY: SOCIAL INSECURITY: DO YOU FEEL ANYONE HAS EXPLOITED OR TAKEN ADVANTAGE OF YOU FINANCIALLY OR OF YOUR PERSONAL PROPERTY?: NO

## 2024-09-15 SDOH — SOCIAL STABILITY: SOCIAL INSECURITY: HAVE YOU HAD ANY THOUGHTS OF HARMING ANYONE ELSE?: NO

## 2024-09-15 SDOH — SOCIAL STABILITY: SOCIAL INSECURITY: ARE THERE ANY APPARENT SIGNS OF INJURIES/BEHAVIORS THAT COULD BE RELATED TO ABUSE/NEGLECT?: NO

## 2024-09-15 SDOH — SOCIAL STABILITY: SOCIAL INSECURITY: HAVE YOU HAD THOUGHTS OF HARMING ANYONE ELSE?: NO

## 2024-09-15 SDOH — SOCIAL STABILITY: SOCIAL INSECURITY: ARE YOU OR HAVE YOU BEEN THREATENED OR ABUSED PHYSICALLY, EMOTIONALLY, OR SEXUALLY BY ANYONE?: NO

## 2024-09-15 SDOH — SOCIAL STABILITY: SOCIAL INSECURITY: DOES ANYONE TRY TO KEEP YOU FROM HAVING/CONTACTING OTHER FRIENDS OR DOING THINGS OUTSIDE YOUR HOME?: NO

## 2024-09-15 SDOH — SOCIAL STABILITY: SOCIAL INSECURITY: HAS ANYONE EVER THREATENED TO HURT YOUR FAMILY OR YOUR PETS?: NO

## 2024-09-15 ASSESSMENT — COLUMBIA-SUICIDE SEVERITY RATING SCALE - C-SSRS
6. HAVE YOU EVER DONE ANYTHING, STARTED TO DO ANYTHING, OR PREPARED TO DO ANYTHING TO END YOUR LIFE?: NO
1. IN THE PAST MONTH, HAVE YOU WISHED YOU WERE DEAD OR WISHED YOU COULD GO TO SLEEP AND NOT WAKE UP?: NO
2. HAVE YOU ACTUALLY HAD ANY THOUGHTS OF KILLING YOURSELF?: NO

## 2024-09-15 ASSESSMENT — LIFESTYLE VARIABLES
HOW OFTEN DO YOU HAVE A DRINK CONTAINING ALCOHOL: 2-3 TIMES A WEEK
HOW OFTEN DO YOU HAVE 6 OR MORE DRINKS ON ONE OCCASION: NEVER
AUDIT-C TOTAL SCORE: 4
HOW MANY STANDARD DRINKS CONTAINING ALCOHOL DO YOU HAVE ON A TYPICAL DAY: 3 OR 4
AUDIT-C TOTAL SCORE: 4
SKIP TO QUESTIONS 9-10: 0

## 2024-09-15 ASSESSMENT — COGNITIVE AND FUNCTIONAL STATUS - GENERAL
PATIENT BASELINE BEDBOUND: NO
MOBILITY SCORE: 24
DAILY ACTIVITIY SCORE: 24
MOBILITY SCORE: 24
DAILY ACTIVITIY SCORE: 24

## 2024-09-15 ASSESSMENT — PAIN SCALES - GENERAL
PAINLEVEL_OUTOF10: 0 - NO PAIN

## 2024-09-15 ASSESSMENT — ACTIVITIES OF DAILY LIVING (ADL)
PATIENT'S MEMORY ADEQUATE TO SAFELY COMPLETE DAILY ACTIVITIES?: YES
GROOMING: INDEPENDENT
ADEQUATE_TO_COMPLETE_ADL: YES
FEEDING YOURSELF: INDEPENDENT
JUDGMENT_ADEQUATE_SAFELY_COMPLETE_DAILY_ACTIVITIES: YES
WALKS IN HOME: INDEPENDENT
TOILETING: INDEPENDENT
HEARING - LEFT EAR: FUNCTIONAL
DRESSING YOURSELF: INDEPENDENT
ASSISTIVE_DEVICE: EYEGLASSES
LACK_OF_TRANSPORTATION: NO
HEARING - RIGHT EAR: FUNCTIONAL
BATHING: INDEPENDENT

## 2024-09-15 ASSESSMENT — ENCOUNTER SYMPTOMS
FEVER: 0
HEADACHES: 0
ABDOMINAL PAIN: 0
CHEST TIGHTNESS: 0
DIZZINESS: 0
WHEEZING: 0
COUGH: 0
VOMITING: 0
SHORTNESS OF BREATH: 1
CHILLS: 0
FATIGUE: 0

## 2024-09-15 ASSESSMENT — PATIENT HEALTH QUESTIONNAIRE - PHQ9
2. FEELING DOWN, DEPRESSED OR HOPELESS: NOT AT ALL
1. LITTLE INTEREST OR PLEASURE IN DOING THINGS: NOT AT ALL
SUM OF ALL RESPONSES TO PHQ9 QUESTIONS 1 & 2: 0

## 2024-09-15 ASSESSMENT — PAIN - FUNCTIONAL ASSESSMENT
PAIN_FUNCTIONAL_ASSESSMENT: 0-10

## 2024-09-15 NOTE — ED PROVIDER NOTES
HPI   Chief Complaint   Patient presents with    Shortness of Breath     Pt has been feeling SOB for about a week, cough, congestion. Hx lung cancer. Urgent care sent pt to ED for further evaluation. Pt does have hx pneumonia and states his symptoms are similar to before.        HPI  Patient is a 74-year-old male with previous history of lung cancer, status post lobectomy, presenting to the ED for shortness of breath and chest congestion.  Patient explains that for about the past week, he has had worsening shortness of breath with exertion as well as lying flat.  He notes that he did see his PCP at the onset of the symptoms, but they have been getting progressively worse.  He initially went to urgent care where he had a chest x-ray done, but was sent here to the ED for further management.  Patient otherwise denies any fever, chest pain, abdominal pain, vomiting, or changes in bowel or bladder habits.  Patient states that he is not currently on any diuretics.  He does not have a cardiologist that he follows with.      Patient History   Past Medical History:   Diagnosis Date    Genital herpes      Past Surgical History:   Procedure Laterality Date    CHOLECYSTECTOMY      CT LUNG MEDIASTINUM BIOPSY Left 07/22/2022    [place]- unknown result- sarcoidosis vs malignancy    LUNG LOBECTOMY Left     VATS and BRYCE resection 8/2022     Family History   Problem Relation Name Age of Onset    Breast cancer Parent       Social History     Tobacco Use    Smoking status: Former     Current packs/day: 1.00     Average packs/day: 1 pack/day for 16.0 years (16.0 ttl pk-yrs)     Types: Cigarettes    Smokeless tobacco: Never    Tobacco comments:     Quit 1990   Substance Use Topics    Alcohol use: Not Currently     Alcohol/week: 5.0 standard drinks of alcohol     Types: 5 Standard drinks or equivalent per week     Comment: a few oz liquor every other day    Drug use: Never       Physical Exam   ED Triage Vitals   Temperature Heart Rate  Respirations BP   09/15/24 0932 09/15/24 0932 09/15/24 0932 09/15/24 0932   36.1 °C (96.9 °F) 72 17 175/89      Pulse Ox Temp Source Heart Rate Source Patient Position   09/15/24 0932 09/15/24 0932 09/15/24 1100 --   96 % Temporal Monitor       BP Location FiO2 (%)     -- --             Physical Exam  Vitals and nursing note reviewed.   Constitutional:       General: He is not in acute distress.     Appearance: He is not toxic-appearing.   HENT:      Head: Normocephalic.      Mouth/Throat:      Mouth: Mucous membranes are moist.   Eyes:      Extraocular Movements: Extraocular movements intact.      Conjunctiva/sclera: Conjunctivae normal.   Cardiovascular:      Rate and Rhythm: Normal rate and regular rhythm.      Pulses: Normal pulses.   Pulmonary:      Effort: Pulmonary effort is normal. No respiratory distress.      Comments: Crackles present in bilateral lung bases  Abdominal:      General: There is no distension.      Palpations: Abdomen is soft.      Tenderness: There is no abdominal tenderness.   Musculoskeletal:      Cervical back: Neck supple.      Comments: 2+ pitting edema of the bilateral lower extremities extending up to the mid shins   Skin:     General: Skin is warm and dry.      Capillary Refill: Capillary refill takes less than 2 seconds.   Neurological:      General: No focal deficit present.      Mental Status: He is alert. Mental status is at baseline.           ED Course & MDM   ED Course as of 09/15/24 1312   Sun Sep 15, 2024   1018 EKG obtained at 932, interpreted by myself.  Normal sinus rhythm with a ventricular rate of 76, frequent PVCs, bifascicular block present, otherwise normal intervals with no acute ischemic changes [VT]   1110 EKG obtained at 1044, interpreted by myself.  Normal sinus rhythm with a ventricular rate of 67, left axis deviation, right bundle branch block present, otherwise normal intervals with no acute ischemic changes [VT]      ED Course User Index  [VT] Lou Carrasco V  MD         Diagnoses as of 09/15/24 1312   Acute congestive heart failure, unspecified heart failure type (Multi)   Bilateral pleural effusion   Shortness of breath                 No data recorded     [place] Coma Scale Score: 15 (09/15/24 0933 : Clarisa Heck RN)                           Medical Decision Making  Patient was seen and evaluated for shortness of breath.  Differential diagnosis includes but is not limited to pneumonia, pneumothorax, COPD exacerbation, CHF exacerbation, PE, ACS, URI, Viral illness, Anemia, Electrolyte abnormality.  On arrival, patient is oxygenating well on room air.  Patient is placed on a cardiac monitor with continuous pulse ox.  Additional labs and imaging are ordered for further evaluation of the patient's symptoms.    CBC is unremarkable.  CMP is unremarkable.  High sensitive troponin is slightly elevated at 29, though stable with repeat of 28.  BNP is elevated at >1000.  COVID-19 and influenza swabs are negative.  CT angio chest for pulmonary embolism   Final Result   No evidence of pulmonary embolism.        Extensive thoracic and upper abdominal adenopathy with bilateral   pulmonary nodules/masses consistent with known history of lung cancer   and metastatic disease. Further evaluation/follow-up recommended.        Septal thickening in both lung bases with pleural effusions could   reflect superimposed CHF/fluid overload. Clinical correlation   recommended.        MACRO:   None.        Signed by: Charley Nelson 9/15/2024 10:51 AM   Dictation workstation:   [id number]        On reevaluation, patient is resting comfortably in bed.  He notes that he did become orthopneic here in the ED, as well as short of breath while walking to the bathroom.  Given findings concerning for CHF, patient is administered a dose of Lasix 20 mg IV as he is not currently on any diuretics.  Patient was informed of their lab and imaging results, and all questions and concerns were answered.  Admission planning for further management was discussed at this time, to which the patient was agreeable.  I discussed the case with Lu Silver, ESHA on-call for the hospitalist service, who accepts patient for admission under Dr. Mackenzie.      Procedure  Procedures     Lou DEXTER MD  09/15/24 [id number]

## 2024-09-15 NOTE — ED TRIAGE NOTES
Pt has been feeling SOB for about a week, cough, congestion. Hx lung cancer. Urgent care sent pt to ED for further evaluation. Pt does have hx pneumonia and states his symptoms are similar to before.

## 2024-09-15 NOTE — CARE PLAN
The patient's goals for the shift include To breathe better    The clinical goals for the shift include Pt's weight will go down by tomorrow    Pt got Lasix in ED today and voiding large amount of urine. Pt up to toilet ad scott. Lungs are clear. Less dyspnea. Taking diet well. Fluid restrictions explained and aware about daily weights. No c/o pain. Tele is SR with PAC's. Pulse ox >90% on room air.

## 2024-09-15 NOTE — PROGRESS NOTES
Subjective   Patient ID: Abhilash Escamilla is a 74 y.o. male. They present today with a chief complaint of No chief complaint on file..    History of Present Illness  Patient is a 74-year-old male with a past medical history of squamous cell carcinoma of the lung, sarcoidosis 20 years ago, pulmonary lung nodules presenting to the clinic with complaints of shortness of breath on exertion.  Patient states he has no shortness of breath at rest.  Patient states he has no chest pain.  Patient states he has shortness of breath on exertion.  When patient takes a flight of stairs he states he feels that he is more short of breath than normal.  Patient denies cough, fever, chills.      History provided by:  Patient      Past Medical History  Allergies as of 09/15/2024    (No Known Allergies)       (Not in a hospital admission)       Past Medical History:   Diagnosis Date    Genital herpes        Past Surgical History:   Procedure Laterality Date    CHOLECYSTECTOMY      CT LUNG MEDIASTINUM BIOPSY Left 07/22/2022    [place]- unknown result- sarcoidosis vs malignancy    LUNG LOBECTOMY Left     VATS and BRYCE resection 8/2022        reports that he has quit smoking. His smoking use included cigarettes. He has a 16 pack-year smoking history. He has never used smokeless tobacco. He reports that he does not currently use alcohol after a past usage of about 5.0 standard drinks of alcohol per week. He reports that he does not use drugs.    Review of Systems  Review of Systems   Constitutional:  Negative for chills, fatigue and fever.   HENT:  Negative for congestion.    Respiratory:  Positive for shortness of breath. Negative for cough, chest tightness and wheezing.    Cardiovascular:  Negative for chest pain.   Gastrointestinal:  Negative for abdominal pain and vomiting.   Skin:  Negative for rash.   Neurological:  Negative for dizziness and headaches.                                  Objective    There were no vitals filed for this  visit.  No LMP for male patient.    Physical Exam  Constitutional:       General: He is not in acute distress.     Appearance: Normal appearance. He is normal weight. He is not ill-appearing or toxic-appearing.   HENT:      Head: Normocephalic and atraumatic.   Cardiovascular:      Rate and Rhythm: Normal rate and regular rhythm.      Heart sounds: Normal heart sounds. No murmur heard.     No friction rub. No gallop.   Pulmonary:      Effort: Pulmonary effort is normal. No respiratory distress.      Breath sounds: Normal breath sounds. No stridor. No wheezing, rhonchi or rales.   Skin:     General: Skin is warm.   Neurological:      General: No focal deficit present.      Mental Status: He is alert and oriented to person, place, and time.         Procedures    Point of Care Test & Imaging Results from this visit  No results found for this visit on 09/15/24.   No results found.    Diagnostic study results (if any) were reviewed by [facility].    Assessment/Plan   Allergies, medications, history, and pertinent labs/EKGs/Imaging reviewed by Edgar Rivers PA-C.     Medical Decision Making  Patient is a 74-year-old male with a past medical history of squamous cell carcinoma of the lung, sarcoidosis 20 years ago, pulmonary lung nodules presenting to the clinic with complaints of shortness of breath on exertion.  Patient states he has no shortness of breath at rest.  Patient states he has no chest pain.  Patient states he has shortness of breath on exertion.  When patient takes a flight of stairs he states he feels that he is more short of breath than normal.  Patient denies cough, fever, chills. Vital signs in the clinic are stable at this time.  Temp 97.  Blood pressure 160/71.  Heart rate 63.  Pulse ox 94%.  Patient EKG abnormal, sinus arrhythmia, intra atrial conduction delay, left axis deviation, abnormal EKG.  No signs of acute ST segment elevations.  Chest x-ray with blunted costophrenic angles.  Radiologist interpretation with significant bilateral lower lobe predominant interstitial opacities that could represent chronic lung disease versus edema.  Advise clinical correlation with dedicated CT with trace left-sided pleural effusion.  Attending physician consulted on case.  Attending physician's recommendation based off of patient's symptoms and diagnostic testing patient needs to go to the emergency room for further evaluation of heart function requiring blood work and possibly CT scan in the emergency room for further evaluation.  [facility] will be given signout. Patient to drive directly to [facility].    Orders and Diagnoses  There are no diagnoses linked to this encounter.    Medical Admin Record      Follow Up Instructions  No follow-ups on file.    Patient disposition: ED    Electronically signed by [facility]  8:38 AM

## 2024-09-16 ENCOUNTER — APPOINTMENT (OUTPATIENT)
Dept: CARDIOLOGY | Facility: HOSPITAL | Age: 74
End: 2024-09-16
Payer: MEDICARE

## 2024-09-16 VITALS
HEART RATE: 58 BPM | SYSTOLIC BLOOD PRESSURE: 121 MMHG | RESPIRATION RATE: 17 BRPM | OXYGEN SATURATION: 95 % | DIASTOLIC BLOOD PRESSURE: 77 MMHG | BODY MASS INDEX: 30.28 KG/M2 | WEIGHT: 228.5 LBS | TEMPERATURE: 97.3 F | HEIGHT: 73 IN

## 2024-09-16 LAB
ANION GAP SERPL CALC-SCNC: 11 MMOL/L (ref 10–20)
BNP SERPL-MCNC: 621 PG/ML (ref 0–99)
BUN SERPL-MCNC: 17 MG/DL (ref 6–23)
CALCIUM SERPL-MCNC: 9.6 MG/DL (ref 8.6–10.3)
CARDIAC TROPONIN I PNL SERPL HS: 35 NG/L (ref 0–20)
CHLORIDE SERPL-SCNC: 102 MMOL/L (ref 98–107)
CO2 SERPL-SCNC: 33 MMOL/L (ref 21–32)
CREAT SERPL-MCNC: 1.48 MG/DL (ref 0.5–1.3)
EGFRCR SERPLBLD CKD-EPI 2021: 49 ML/MIN/1.73M*2
ERYTHROCYTE [DISTWIDTH] IN BLOOD BY AUTOMATED COUNT: 13.2 % (ref 11.5–14.5)
GLUCOSE SERPL-MCNC: 113 MG/DL (ref 74–99)
HCT VFR BLD AUTO: 45 % (ref 41–52)
HGB BLD-MCNC: 15 G/DL (ref 13.5–17.5)
MCH RBC QN AUTO: 30.8 PG (ref 26–34)
MCHC RBC AUTO-ENTMCNC: 33.3 G/DL (ref 32–36)
MCV RBC AUTO: 92 FL (ref 80–100)
NRBC BLD-RTO: 0 /100 WBCS (ref 0–0)
PLATELET # BLD AUTO: 228 X10*3/UL (ref 150–450)
POTASSIUM SERPL-SCNC: 4.1 MMOL/L (ref 3.5–5.3)
RBC # BLD AUTO: 4.87 X10*6/UL (ref 4.5–5.9)
SODIUM SERPL-SCNC: 142 MMOL/L (ref 136–145)
WBC # BLD AUTO: 5.6 X10*3/UL (ref 4.4–11.3)

## 2024-09-16 PROCEDURE — 2500000004 HC RX 250 GENERAL PHARMACY W/ HCPCS (ALT 636 FOR OP/ED)

## 2024-09-16 PROCEDURE — G0378 HOSPITAL OBSERVATION PER HR: HCPCS

## 2024-09-16 PROCEDURE — 93306 TTE W/DOPPLER COMPLETE: CPT

## 2024-09-16 PROCEDURE — 84484 ASSAY OF TROPONIN QUANT: CPT

## 2024-09-16 PROCEDURE — 2500000001 HC RX 250 WO HCPCS SELF ADMINISTERED DRUGS (ALT 637 FOR MEDICARE OP): Performed by: NURSE PRACTITIONER

## 2024-09-16 PROCEDURE — 80048 BASIC METABOLIC PNL TOTAL CA: CPT

## 2024-09-16 PROCEDURE — 85027 COMPLETE CBC AUTOMATED: CPT

## 2024-09-16 PROCEDURE — 36415 COLL VENOUS BLD VENIPUNCTURE: CPT

## 2024-09-16 PROCEDURE — 93306 TTE W/DOPPLER COMPLETE: CPT | Performed by: INTERNAL MEDICINE

## 2024-09-16 PROCEDURE — 96376 TX/PRO/DX INJ SAME DRUG ADON: CPT | Mod: 59

## 2024-09-16 PROCEDURE — 99222 1ST HOSP IP/OBS MODERATE 55: CPT | Performed by: NURSE PRACTITIONER

## 2024-09-16 PROCEDURE — 83880 ASSAY OF NATRIURETIC PEPTIDE: CPT

## 2024-09-16 RX ORDER — ACETAMINOPHEN 325 MG/1
650 TABLET ORAL EVERY 4 HOURS PRN
Status: DISCONTINUED | OUTPATIENT
Start: 2024-09-16 | End: 2024-09-16 | Stop reason: HOSPADM

## 2024-09-16 ASSESSMENT — ENCOUNTER SYMPTOMS
HEMATOLOGIC/LYMPHATIC NEGATIVE: 1
ENDOCRINE NEGATIVE: 1
MUSCULOSKELETAL NEGATIVE: 1
GASTROINTESTINAL NEGATIVE: 1
PSYCHIATRIC NEGATIVE: 1
EYES NEGATIVE: 1
ALLERGIC/IMMUNOLOGIC NEGATIVE: 1
NEUROLOGICAL NEGATIVE: 1
CONSTITUTIONAL NEGATIVE: 1
SHORTNESS OF BREATH: 1

## 2024-09-16 ASSESSMENT — PAIN SCALES - GENERAL: PAINLEVEL_OUTOF10: 0 - NO PAIN

## 2024-09-16 ASSESSMENT — ACTIVITIES OF DAILY LIVING (ADL): LACK_OF_TRANSPORTATION: NO

## 2024-09-16 NOTE — NURSING NOTE
Patient is in agreement to CHF education and one time follow-up phone call after discharge to home. Patient verbalized a solid foundation of understanding of monitoring weight, diet considerations and addressing signs and symptoms early for their CHF. Review of educational materials were provided to patient along with a digital weight scale and a fluid tumbler with measurements..  NARENDRA Hyatt, [facility] RN.

## 2024-09-16 NOTE — NURSING NOTE
Patient laying in bed awake. Urinal at bedside. Patient educated on new diagnosis of CHF and made aware of lifestyle changes and monitoring.

## 2024-09-16 NOTE — CARE PLAN
The patient's goals for the shift include To breathe better    The clinical goals for the shift include pt will be monitored for strict I&O during shift    Over the shift, the patient did state he breathing is much better, lungs clear. Patient receiving iv push lasix and diuresed over 4L. Patient educated on new diagnosis of CHF and was able to return teach. Using urinal at bedside. Denied pain. Educated patient to make sure he is drinking enough with his 1500 fluid restrictions.

## 2024-09-16 NOTE — DISCHARGE INSTR - OTHER ORDERS
Thank you for choosing [facility] for your Health Care needs.  Also, thank you for allowing us to take you and your families preferences into account when determining your discharge plan.  Stay well!    Your Care Transitions Team Member:Annita Ward Robin and Maggy [phone]    For questions about your medications listed on your discharge instructions, please call the Nurses Station at [phone].

## 2024-09-16 NOTE — ASSESSMENT & PLAN NOTE
Principal Problems  #Symptom of congestive heart failure  -WBC 5.1  -Covid/Flu Negative  -BNP 1,032  -Troponin 29 > 28  -CXR  IMPRESSION:  No old films available for comparison.  1. Significant bilateral lower lobe predominance interstitial  opacities could be related to underlying chronic lung disease or  edema. Advise clinical correlation and further assessment by  dedicated chest CT scan could be considered.  2. Trace left-sided pleural effusion.  -CT Angio Chest  IMPRESSION:  No evidence of pulmonary embolism.  Extensive thoracic and upper abdominal adenopathy with bilateral  pulmonary nodules/masses consistent with known history of lung cancer  and metastatic disease. Further evaluation/follow-up recommended.  Septal thickening in both lung bases with pleural effusions could  reflect superimposed CHF/fluid overload. Clinical correlation  recommended.  -Home O2, 2L HS  -Currently on 2L HS  -Echo Ordered  -Lasix 20 mg IV BID  -Cardiology consult, appreciate recs     Active Problems  #Gastroesophageal reflux disease without esophagitis  -Protonix 40 mg Daily     #Mixed hyperlipidemia  -continue Lipitor 40 mg PO Daily

## 2024-09-16 NOTE — CONSULTS
Nutrition Diet Education  Dietitian discretion (CHF education)     Education Documentation  Nutrition Care Manual, taught by Janeth Frost RDN, MARIANO at 9/16/2024  2:32 PM.  Learner: Significant Other, Patient  Readiness: Eager  Method: Explanation,   Response: Verbalizes Understanding, Needs Reinforcement  Comment: Would benefit from additional education once he is home and settled.      Visited patient at bedside as he was preparing for discharge. He had just spoken with the nurse educator about CHF and had excellent nutrition questions regarding sodium and fluid intake. Reviewed his questions, including how to follow a low-sodium diet, read food labels, and manage fluid intake. The patient acknowledged the need for dietary changes and expressed concern about reducing his salt intake, as he enjoys it. However, he stated that he feels motivated to make the necessary changes.    Follow Up:     Time Spent (min): 60 minutes  Follow up: Provided inpatient RDN contact information  Last Date of Nutrition Visit: 09/16/24  Nutrition Follow-Up Needed?: Dietitian to reassess per policy

## 2024-09-16 NOTE — CONSULTS
Cardiology Consult Note  Patient: Abhilash Escamilla  Unit/Bed: 230/230-A  YOB: 1950    Admitting Diagnosis: Shortness of breath [R06.02]  Shortness of breath on exertion [R06.02]  Bilateral pleural effusion [J90]  Acute congestive heart failure, unspecified heart failure type (Multi) [I50.9]  Symptom of congestive heart failure [R09.89]  Date:  9/15/2024  Hospital Day: 0  Attending: Cardiology consulting at the request of  Stephen Huff MD  for Heart failure      Chief Complaint   Patient presents with    Shortness of Breath     Pt has been feeling SOB for about a week, cough, congestion. Hx lung cancer. Urgent care sent pt to ED for further evaluation. Pt does have hx pneumonia and states his symptoms are similar to before.             History of Present Illness:   Abhilash Escamilla is a 74 y.o. Male who presented via [facility] with c/o progressive shortness of breath and chest congestion for the past 1 week accompanied by orthopnea.    BNP 1032--621  hsTrop 29--28--33--35  -4.1 L by I/O after total 40 mg IV lasix,  -6 lbs             Past medical history significant for  pulmonary sarcoidosis,  lung cancer  s/p wedge resection August 2022,  essential tremor,  BPV,    Primary Cardiology outpatient: NONE         SUBJECTIVE:   He is feeling much better after diuresis and able to walk around his room without dyspnea  or chest discomfort               ALLERGIES:   No Known Allergies   Home Medication:  Medications Prior to Admission   Medication Sig Dispense Refill Last Dose    atorvastatin (Lipitor) 40 mg tablet Take 1 tablet (40 mg) by mouth once daily at bedtime.   9/14/2024 at 2100    omeprazole OTC (PriLOSEC OTC) 20 mg EC tablet Take 1 tablet (20 mg) by mouth once daily at bedtime. Do not crush, chew, or split.   9/14/2024 at 2100    oxygen (O2) therapy Inhale 2 L/min at 120,000 mL/hr once daily at bedtime. via nasal canula    Sleep study shows hypoxia 88% overnight without sleep apnea.  PFT no response  to bronchodilator.   9/14/2024 at 2100    ciclopirox (Penlac) 8 % solution Apply 1 Application topically once daily at bedtime. Remove with alcohol every 7 days; continue therapy until nail clearance 6.6 mL 5       PMHx:  Past Medical History:   Diagnosis Date    Genital herpes        PSHx:  Past Surgical History:   Procedure Laterality Date    CHOLECYSTECTOMY      CT LUNG MEDIASTINUM BIOPSY Left 07/22/2022    [place]- unknown result- sarcoidosis vs malignancy    LUNG LOBECTOMY Left     VATS and BRYCE resection 8/2022       SOCIAL Hx:  Social History     Socioeconomic History    Marital status:    Tobacco Use    Smoking status: Former     Current packs/day: 1.00     Average packs/day: 1 pack/day for 16.0 years (16.0 ttl pk-yrs)     Types: Cigarettes    Smokeless tobacco: Never    Tobacco comments:     Quit 1990   Substance and Sexual Activity    Alcohol use: Not Currently     Alcohol/week: 5.0 standard drinks of alcohol     Types: 5 Standard drinks or equivalent per week     Comment: a few oz liquor every other day    Drug use: Never     Social Determinants of Health     Financial Resource Strain: Medium Risk (9/15/2024)    Overall Financial Resource Strain (CARDIA)     Difficulty of Paying Living Expenses: Somewhat hard   Transportation Needs: No Transportation Needs (9/15/2024)    PRAPARE - Transportation     Lack of Transportation (Medical): No     Lack of Transportation (Non-Medical): No   Housing Stability: Low Risk  (9/15/2024)    Housing Stability Vital Sign     Unable to Pay for Housing in the Last Year: No     Number of Times Moved in the Last Year: 1     Homeless in the Last Year: No       FAMILY Hx:  Family History   Problem Relation Name Age of Onset    Breast cancer Parent         REVIEW OF SYMPTOMS:   12 system review of symptoms is negative except as noted above          OBJECTIVE:     VITALS:   Visit Vitals  /77 (BP Location: Right arm, Patient Position: Lying)   Pulse 58   Temp 36.3 °C  "(97.3 °F) (Temporal)   Resp 17        Wt Readings from Last 5 Encounters:   09/16/24 104 kg (228 lb 8 oz)   09/15/24 106 kg (234 lb)   05/07/24 109 kg (240 lb 9.6 oz)   11/07/23 110 kg (243 lb)   07/17/23 110 kg (242 lb 3.2 oz)       INTAKE/ OUTPUT:   I/O last 3 completed shifts:  In: 640 (6.2 mL/kg) [P.O.:640]  Out: 4760 (45.9 mL/kg) [Urine:4760 (1.3 mL/kg/hr)]  Weight: 103.6 kg      TELEMETRY MONITORING: NSR     PHYSICAL EXAMINATION:    Constitutional:       Appearance: Healthy appearance. Not in distress.   Eyes:      Conjunctiva/sclera: Conjunctivae normal.   Neck:      Vascular: JVD normal.   Pulmonary:      Effort: Pulmonary effort is normal.      Breath sounds: Normal breath sounds.   Cardiovascular:      PMI at left midclavicular line. Normal rate. Regular rhythm. Normal S1. Normal S2.       Murmurs: There is no murmur.      No rub.   Pulses:     Intact distal pulses.   Edema:     Peripheral edema absent.   Abdominal:      General: Bowel sounds are normal.   Musculoskeletal:      Cervical back: Neck supple. Skin:     General: Skin is warm and dry.   Neurological:      Mental Status: Alert and oriented to person, place and time.            LABS:  CMP:   Recent Labs     09/16/24  0702 09/15/24  0939 08/28/23  0839    142 142   K 4.1 3.8 4.2    108* 107   CO2 33* 26 27   ANIONGAP 11 12 12   BUN 17 17 16   CREATININE 1.48* 1.20 1.18   EGFR 49* 63  --    MG  --   --  1.77     LIVER ENZYMES:   Recent Labs     09/15/24  0939 08/28/23  0839   ALBUMIN 3.8 4.0   ALT 11 15   AST 14 16   BILITOT 1.1 1.4*     CBC:  Recent Labs     09/16/24  0702 09/15/24  0939 08/28/23  0839   WBC 5.6 5.1 5.0   HGB 15.0 15.1 15.6   HCT 45.0 45.1 47.3    219 268   MCV 92 92 94     COAG: No results for input(s): \"PTT\", \"INR\", \"ARIXTRA\" in the last 86022 hours.  ABO: No results for input(s): \"ABO\" in the last 40225 hours.  HEME/ENDO:  Recent Labs     03/26/24  0917 08/28/23  0839   TSH  --  1.90   HGBA1C 5.3  --     " "  CARDIAC:   Recent Labs     09/16/24  0702 09/15/24  1727 09/15/24  1047 09/15/24  0939   TROPHS 35* 33* 28* 29*   *  --   --  1,032*       Lipid Panel:  No results found for: \"HDL\", \"CHHDL\", \"VLDL\", \"TRIG\", \"NHDL\"       CURRENT MEDICATIONS:   Infusions:  sodium chloride 0.9%      Scheduled:  atorvastatin, 40 mg, Nightly  enoxaparin, 40 mg, q24h  furosemide, 20 mg, BID  pantoprazole, 40 mg, Daily before breakfast   Or  pantoprazole, 40 mg, Daily before breakfast  perflutren lipid microspheres, 0.5-10 mL of dilution, Once in imaging  polyethylene glycol, 17 g, Daily      PRN:  acetaminophen, 650 mg, q4h PRN  benzocaine-menthol, 1 lozenge, q2h PRN  dextromethorphan-guaifenesin, 5 mL, q4h PRN  guaiFENesin, 600 mg, q12h PRN  melatonin, 6 mg, Nightly PRN  ondansetron ODT, 4 mg, q8h PRN   Or  ondansetron, 4 mg, q8h PRN  sodium chloride 0.9%, 10 mL/hr, Continuous PRN          LAST IMAGING RESULTS   CT angio chest for pulmonary embolism  Narrative: Interpreted By:  Charley Nelson,   STUDY:  CT ANGIO CHEST FOR PULMONARY EMBOLISM;  9/15/2024 10:24 am      INDICATION:  Signs/Symptoms:sob, hx lung ca.          COMPARISON:  None.      ACCESSION NUMBER(S):  [id number]      ORDERING CLINICIAN:  BRUCE PIERRE      TECHNIQUE:  CT of the chest was performed. Sagittal and coronal reconstructions  were generated. 75 ML Omnipaque 350 intravenous contrast given for  the examination.  Multiplanar reconstructions of the pulmonary  vessels were created on an independent workstation and provided for  review.      FINDINGS:          CHEST WALL AND LOWER NECK: No significant axillary lymphadenopathy.  Mild bilateral gynecomastia.      MEDIASTINUM AND JEB:  Extensive partially calcified adenopathy  throughout the mediastinum and both jeb includes 5.5 x 3.1 cm dc  conglomerate in the subcarinal space, 2 cm pretracheal node image  87/309, 2.5 cm paraesophageal node image 213/309, 3.3 x 2.3 cm right  hilar node image 120/309.      HEART " AND VESSELS:  No pulmonary artery filling defect to suggest PE.  Small amount of contrast reflux into the upper abdominal IVC and  hepatic veins. The heart is prominent. No significant pericardial  effusion. No thoracic aortic aneurysm. Atherosclerotic calcifications  including the coronary arteries.      LUNGS, PLEURA, LARGE AIRWAYS:  Dense suture line in the medial left  upper chest. Irregular partially perihilar opacities with slight  bronchial narrowing, most prominent in the right upper lobe. Multiple  ill-defined nodules scattered throughout both lungs. Bibasilar  heterogeneity with septal thickening. Moderate left and small right  pleural effusions. The trachea is patent.      UPPER ABDOMEN:  Likely status post cholecystectomy. No focal mass  lesion in the included upper abdominal viscera. 3.1 x 2.5 cm node in  the medial left upper quadrant.      BONES:  Degenerative changes of the visualized spine.      Impression: No evidence of pulmonary embolism.      Extensive thoracic and upper abdominal adenopathy with bilateral  pulmonary nodules/masses consistent with known history of lung cancer  and metastatic disease. Further evaluation/follow-up recommended.      Septal thickening in both lung bases with pleural effusions could  reflect superimposed CHF/fluid overload. Clinical correlation  recommended.      MACRO:  None.      Signed by: Charley Nelson 9/15/2024 10:51 AM  Dictation workstation:   [id number]  ECG 12 Lead  EKG performed 9/15/2024    TN interval 198 ms.   ms.  QTc 487 ms.  Heart rate 78 bpm    EKG read: Sinus arrhythmia, premature ventricular complexes, aberrant   ventricular conduction, intra-atrial conduction delay, left axis   deviation, anterior septal infarct, abnormal EKG.    My attending physician and I do not see any signs of ST segment elevations  XR chest 2 views  Narrative: Interpreted By:  Fer Belcher,   STUDY:  XR CHEST 2 VIEWS;  9/15/2024 8:51 am       INDICATION:  Signs/Symptoms:SOB.      ,R06.02 Shortness of breath      COMPARISON:  None.      ACCESSION NUMBER(S):  [id number]      ORDERING CLINICIAN:  JERMAINE RIDLEY      FINDINGS:                  CARDIOMEDIASTINAL SILHOUETTE:  Cardiomediastinal silhouette is normal in size and configuration.      LUNGS:  Significant bilateral lower lobe predominant interstitial opacities.  No major infiltrates. Trace left-sided pleural effusion..      ABDOMEN:  No remarkable upper abdominal findings.      BONES:  No acute osseous changes.      Impression: No old films available for comparison.  1. Significant bilateral lower lobe predominance interstitial  opacities could be related to underlying chronic lung disease or  edema. Advise clinical correlation and further assessment by  dedicated chest CT scan could be considered.  2. Trace left-sided pleural effusion.              MACRO:  None      Signed by: Fer Belcher 9/15/2024 9:03 AM  Dictation workstation:   [id number]           CARDIOVASCULAR STUDIES:       EKG dated 9/15/2024 independently reviewed   SR, 67,  PVCs,  RBBB         Echocardiogram 9/16/2024  CONCLUSIONS:   1. Left ventricular ejection fraction is mildly decreased, by visual estimate at 40-45%.   2. There is global hypokinesis of the left ventricle with minor regional variations.   3. Spectral Doppler shows an impaired relaxation pattern of left ventricular diastolic filling.   4. There is normal right ventricular global systolic function.   5. The left atrium is mildly dilated.          ASSESSMENT:   74 YOM with new onset HFmrEF,  NYHA class IV    Pulmonary sarcoidosis  Lung cancer  s/p wedge resection August 2022  Essential tremor  BPV      PLAN:     He responded well to IV diuresis and Is feeling much better    Add Jardiance 10 mg daily   Add Entresto 1/2 dose BID   [facility] 1 week [facility] CV perspective   Planning for outpatient workup r/o cardiac  sarcoidosis           Thank you  for the consult   Will follow   Please call or message with questions, concerns or changes in clinical condition   The case was discussed with SANTHOSH Ryan and Dr. Ruvalcaba           Electronically signed by SANTHOSH Rodriguez on 9/16/2024 at 9:39 AM  I spent 65 minutes in the professional and overall care of this patient.

## 2024-09-16 NOTE — NURSING NOTE
Patient had uneventful night. Slept well. Up independent. Oxygen on per NC at 2L which is chronic at bedtime. Denies pain. Patient lungs are clear. Using urinal for accurate intake and output at bedside. Educated on CHF.

## 2024-09-16 NOTE — DISCHARGE INSTR - DIET
The dietitian that visited with you was Janeth Frost, ABENA, LD, CLC. If you have additional questions please feel free to reach out to her at:  Phone: [phone]   Email: [email or web address]

## 2024-09-16 NOTE — H&P
History Of Present Illness  Abhilash Escamilla is a 74 y.o. male presenting with  shortness of breath and chest congestion.  Previous history of lung cancer, status post lobectomy. Patient explains that for about the past week, he has had worsening shortness of breath with exertion as well as lying flat.  He notes that he did see his PCP at the onset of the symptoms, but they have been getting progressively worse.  He initially went to urgent care where he had a chest x-ray done, but was sent here to the ED for further management.  Patient otherwise denies any fever, chest pain, abdominal pain, vomiting, or changes in bowel or bladder habits.  Patient states that he is not currently on any diuretics.  He does not have a cardiologist that he follows with.   Patient currently resting in bed with no complaints, feeling well.  Discussed treatment plan, patient states understanding, and agrees.       Past Medical History  Past Medical History:   Diagnosis Date    Genital herpes        Surgical History  Past Surgical History:   Procedure Laterality Date    CHOLECYSTECTOMY      CT LUNG MEDIASTINUM BIOPSY Left 07/22/2022    [place]- unknown result- sarcoidosis vs malignancy    LUNG LOBECTOMY Left     VATS and BRYCE resection 8/2022        Social History  He reports that he has quit smoking. His smoking use included cigarettes. He has a 16 pack-year smoking history. He has never used smokeless tobacco. He reports that he does not currently use alcohol after a past usage of about 5.0 standard drinks of alcohol per week. He reports that he does not use drugs.    Family History  Family History   Problem Relation Name Age of Onset    Breast cancer Parent          Allergies  Patient has no known allergies.    Review of Systems   Constitutional: Negative.    HENT: Negative.     Eyes: Negative.    Respiratory:  Positive for shortness of breath.    Cardiovascular:  Positive for leg swelling.   Gastrointestinal: Negative.    Endocrine:  "Negative.    Genitourinary: Negative.    Musculoskeletal: Negative.    Skin: Negative.    Allergic/Immunologic: Negative.    Neurological: Negative.    Hematological: Negative.    Psychiatric/Behavioral: Negative.          Physical Exam  Constitutional:       Appearance: He is obese.   HENT:      Head: Normocephalic and atraumatic.      Nose: Nose normal.      Mouth/Throat:      Mouth: Mucous membranes are moist.   Eyes:      Extraocular Movements: Extraocular movements intact.      Pupils: Pupils are equal, round, and reactive to light.   Cardiovascular:      Rate and Rhythm: Normal rate and regular rhythm.      Pulses: Normal pulses.      Heart sounds: Normal heart sounds.   Pulmonary:      Effort: Pulmonary effort is normal.      Breath sounds: Normal breath sounds.   Abdominal:      General: Bowel sounds are normal.      Palpations: Abdomen is soft.   Musculoskeletal:         General: Normal range of motion.      Cervical back: Normal range of motion.      Right lower leg: Edema present.      Left lower leg: Edema present.   Skin:     General: Skin is warm and dry.      Capillary Refill: Capillary refill takes less than 2 seconds.   Neurological:      Mental Status: He is alert and oriented to person, place, and time. Mental status is at baseline.   Psychiatric:         Mood and Affect: Mood normal.         Behavior: Behavior normal.          Last Recorded Vitals  Blood pressure 177/89, pulse 78, temperature 37.1 °C (98.8 °F), temperature source Temporal, resp. rate 17, height 1.854 m (6' 1\"), weight 106 kg (234 lb 9.1 oz), SpO2 95%.    Relevant Results  Scheduled medications  atorvastatin, 40 mg, oral, Nightly  enoxaparin, 40 mg, subcutaneous, q24h  furosemide, 20 mg, intravenous, BID  pantoprazole, 40 mg, oral, Daily before breakfast   Or  pantoprazole, 40 mg, intravenous, Daily before breakfast  perflutren lipid microspheres, 0.5-10 mL of dilution, intravenous, Once in imaging  perflutren protein A " microsphere, 0.5 mL, intravenous, Once in imaging  polyethylene glycol, 17 g, oral, Daily  sulfur hexafluoride microsphr, 2 mL, intravenous, Once in imaging      Continuous medications  sodium chloride 0.9%, 10 mL/hr      PRN medications  PRN medications: acetaminophen **OR** [DISCONTINUED] acetaminophen **OR** [DISCONTINUED] acetaminophen, benzocaine-menthol, dextromethorphan-guaifenesin, guaiFENesin, melatonin, ondansetron ODT **OR** ondansetron, sodium chloride 0.9%    CT angio chest for pulmonary embolism    Result Date: 9/15/2024  Interpreted By:  Charley Nelson, STUDY: CT ANGIO CHEST FOR PULMONARY EMBOLISM;  9/15/2024 10:24 am   INDICATION: Signs/Symptoms:sob, hx lung ca.     COMPARISON: None.   ACCESSION NUMBER(S): [id number]   ORDERING CLINICIAN: BRUCE PIERRE   TECHNIQUE: CT of the chest was performed. Sagittal and coronal reconstructions were generated. 75 ML Omnipaque 350 intravenous contrast given for the examination.  Multiplanar reconstructions of the pulmonary vessels were created on an independent workstation and provided for review.   FINDINGS:     CHEST WALL AND LOWER NECK: No significant axillary lymphadenopathy. Mild bilateral gynecomastia.   MEDIASTINUM AND JEB:  Extensive partially calcified adenopathy throughout the mediastinum and both jeb includes 5.5 x 3.1 cm dc conglomerate in the subcarinal space, 2 cm pretracheal node image 87/309, 2.5 cm paraesophageal node image 213/309, 3.3 x 2.3 cm right hilar node image 120/309.   HEART AND VESSELS:  No pulmonary artery filling defect to suggest PE. Small amount of contrast reflux into the upper abdominal IVC and hepatic veins. The heart is prominent. No significant pericardial effusion. No thoracic aortic aneurysm. Atherosclerotic calcifications including the coronary arteries.   LUNGS, PLEURA, LARGE AIRWAYS:  Dense suture line in the medial left upper chest. Irregular partially perihilar opacities with slight bronchial narrowing, most prominent  in the right upper lobe. Multiple ill-defined nodules scattered throughout both lungs. Bibasilar heterogeneity with septal thickening. Moderate left and small right pleural effusions. The trachea is patent.   UPPER ABDOMEN:  Likely status post cholecystectomy. No focal mass lesion in the included upper abdominal viscera. 3.1 x 2.5 cm node in the medial left upper quadrant.   BONES:  Degenerative changes of the visualized spine.       No evidence of pulmonary embolism.   Extensive thoracic and upper abdominal adenopathy with bilateral pulmonary nodules/masses consistent with known history of lung cancer and metastatic disease. Further evaluation/follow-up recommended.   Septal thickening in both lung bases with pleural effusions could reflect superimposed CHF/fluid overload. Clinical correlation recommended.   MACRO: None.   Signed by: Charley Nelson 9/15/2024 10:51 AM Dictation workstation:   [id number]    ECG 12 Lead    Result Date: 9/15/2024  EKG performed 9/15/2024 WA interval 198 ms.   ms.  QTc 487 ms.  Heart rate 78 bpm EKG read: Sinus arrhythmia, premature ventricular complexes, aberrant ventricular conduction, intra-atrial conduction delay, left axis deviation, anterior septal infarct, abnormal EKG. My attending physician and I do not see any signs of ST segment elevations    XR chest 2 views    Result Date: 9/15/2024  Interpreted By:  Fer Belcher, STUDY: XR CHEST 2 VIEWS;  9/15/2024 8:51 am   INDICATION: Signs/Symptoms:SOB.   ,R06.02 Shortness of breath   COMPARISON: None.   ACCESSION NUMBER(S): [id number]   ORDERING CLINICIAN: JERMAINE RIDLEY   FINDINGS:         CARDIOMEDIASTINAL SILHOUETTE: Cardiomediastinal silhouette is normal in size and configuration.   LUNGS: Significant bilateral lower lobe predominant interstitial opacities. No major infiltrates. Trace left-sided pleural effusion..   ABDOMEN: No remarkable upper abdominal findings.   BONES: No acute osseous changes.       No old films  available for comparison. 1. Significant bilateral lower lobe predominance interstitial opacities could be related to underlying chronic lung disease or edema. Advise clinical correlation and further assessment by dedicated chest CT scan could be considered. 2. Trace left-sided pleural effusion.       MACRO: None   Signed by: Fer Belcher 9/15/2024 9:03 AM Dictation workstation:   [id number]     Results for orders placed or performed during the hospital encounter of 09/15/24 (from the past 24 hour(s))   CBC with Differential   Result Value Ref Range    WBC 5.1 4.4 - 11.3 x10*3/uL    nRBC 0.0 0.0 - 0.0 /100 WBCs    RBC 4.89 4.50 - 5.90 x10*6/uL    Hemoglobin 15.1 13.5 - 17.5 g/dL    Hematocrit 45.1 41.0 - 52.0 %    MCV 92 80 - 100 fL    MCH 30.9 26.0 - 34.0 pg    MCHC 33.5 32.0 - 36.0 g/dL    RDW 13.2 11.5 - 14.5 %    Platelets 219 150 - 450 x10*3/uL    Neutrophils % 71.2 40.0 - 80.0 %    Immature Granulocytes %, Automated 0.2 0.0 - 0.9 %    Lymphocytes % 8.6 13.0 - 44.0 %    Monocytes % 13.5 2.0 - 10.0 %    Eosinophils % 5.5 0.0 - 6.0 %    Basophils % 1.0 0.0 - 2.0 %    Neutrophils Absolute 3.64 1.60 - 5.50 x10*3/uL    Immature Granulocytes Absolute, Automated 0.01 0.00 - 0.50 x10*3/uL    Lymphocytes Absolute 0.44 (L) 0.80 - 3.00 x10*3/uL    Monocytes Absolute 0.69 0.05 - 0.80 x10*3/uL    Eosinophils Absolute 0.28 0.00 - 0.40 x10*3/uL    Basophils Absolute 0.05 0.00 - 0.10 x10*3/uL   Comprehensive Metabolic Panel   Result Value Ref Range    Glucose 143 (H) 74 - 99 mg/dL    Sodium 142 136 - 145 mmol/L    Potassium 3.8 3.5 - 5.3 mmol/L    Chloride 108 (H) 98 - 107 mmol/L    Bicarbonate 26 21 - 32 mmol/L    Anion Gap 12 10 - 20 mmol/L    Urea Nitrogen 17 6 - 23 mg/dL    Creatinine 1.20 0.50 - 1.30 mg/dL    eGFR 63 >60 mL/min/1.73m*2    Calcium 8.9 8.6 - 10.3 mg/dL    Albumin 3.8 3.4 - 5.0 g/dL    Alkaline Phosphatase 69 33 - 136 U/L    Total Protein 6.1 (L) 6.4 - 8.2 g/dL    AST 14 9 - 39 U/L    Bilirubin, Total 1.1  0.0 - 1.2 mg/dL    ALT 11 10 - 52 U/L   Brain Natriuretic Peptide   Result Value Ref Range    BNP 1,032 (H) 0 - 99 pg/mL   Troponin I, High Sensitivity, Initial   Result Value Ref Range    Troponin I, High Sensitivity 29 (H) 0 - 20 ng/L   Sars-CoV-2 PCR   Result Value Ref Range    Coronavirus 2019, PCR Not Detected Not Detected   Influenza A, and B PCR   Result Value Ref Range    Flu A Result Not Detected Not Detected    Flu B Result Not Detected Not Detected   Troponin, High Sensitivity, 1 Hour   Result Value Ref Range    Troponin I, High Sensitivity 28 (H) 0 - 20 ng/L   Troponin I, High Sensitivity   Result Value Ref Range    Troponin I, High Sensitivity 33 (H) 0 - 20 ng/L        Assessment/Plan   Assessment & Plan  Symptom of congestive heart failure    Gastroesophageal reflux disease without esophagitis    Mixed hyperlipidemia    Principal Problems  #Symptom of congestive heart failure  -WBC 5.1  -Covid/Flu Negative  -BNP 1,032  -Troponin 29 > 28  -CXR  IMPRESSION:  No old films available for comparison.  1. Significant bilateral lower lobe predominance interstitial  opacities could be related to underlying chronic lung disease or  edema. Advise clinical correlation and further assessment by  dedicated chest CT scan could be considered.  2. Trace left-sided pleural effusion.  -CT Angio Chest  IMPRESSION:  No evidence of pulmonary embolism.  Extensive thoracic and upper abdominal adenopathy with bilateral  pulmonary nodules/masses consistent with known history of lung cancer  and metastatic disease. Further evaluation/follow-up recommended.  Septal thickening in both lung bases with pleural effusions could  reflect superimposed CHF/fluid overload. Clinical correlation  recommended.  -Home O2, 2L HS  -Currently on 2L HS  -Echo Ordered  -Lasix 20 mg IV BID  -Cardiology consult, appreciate recs     Active Problems  #Gastroesophageal reflux disease without esophagitis  -Protonix 40 mg Daily     #Mixed  hyperlipidemia  -continue Lipitor 40 mg PO Daily     DVT Prophylaxis: Lovenox   Fluids: N/A   Nutrition: Cardiac    Code Status: Full Code    Pt requires inpatient stay at this time.  Total accumulated time spent face to face and not face to face preparing to see the patient, obtaining and reviewing separately obtained history; performing a medically appropriate examination and/or evaluation; counseling and educating the patient, family; ordering medications, tests, or procedures; referring and communicating with other health care professionals; documenting clinical information in the patient's medical record; independently interpreting results and communicating the results to the patient, family; and care coordination was 45 minutes.    VIDA Hawkins-CNP    Attending Attestation:    I have personally seen and re-examined the patient and performed the medical decision-making components (assessment and plan of care). I have reviewed the documentation and verified the findings in the note as written with additions or exceptions as stated in the body of this note.    Stephen Huff MD  Internal Medicine

## 2024-09-16 NOTE — PROGRESS NOTES
09/16/24 1023   Discharge Planning   Living Arrangements Spouse/significant other   Support Systems Spouse/significant other   Assistance Needed PT. is A&Ox3, states he is independent with ADL's and denies uses assistive devices. Patient drives and is retired. Plan is to discharge home with NN. Wife will  when medically cleared.   Type of Residence Private residence   Number of Stairs to Enter Residence 2   Number of Stairs Within Residence 14   Do you have animals or pets at home? No   Who is requesting discharge planning? Provider   Home or Post Acute Services None   Expected Discharge Disposition Home   Does the patient need discharge transport arranged? No   Financial Resource Strain   How hard is it for you to pay for the very basics like food, housing, medical care, and heating? Somewhat   Housing Stability   In the last 12 months, was there a time when you were not able to pay the mortgage or rent on time? N   In the past 12 months, how many times have you moved where you were living? 1   Transportation Needs   In the past 12 months, has lack of transportation kept you from medical appointments or from getting medications? no   In the past 12 months, has lack of transportation kept you from meetings, work, or from getting things needed for daily living? No       
Appropriate/Calm

## 2024-09-17 ENCOUNTER — TELEPHONE (OUTPATIENT)
Dept: CARDIOLOGY | Facility: HOSPITAL | Age: 74
End: 2024-09-17
Payer: MEDICARE

## 2024-09-17 DIAGNOSIS — I51.89 GRADE I DIASTOLIC DYSFUNCTION: Primary | ICD-10-CM

## 2024-09-17 DIAGNOSIS — R09.89 SYMPTOM OF CONGESTIVE HEART FAILURE: ICD-10-CM

## 2024-09-17 LAB
AORTIC VALVE PEAK VELOCITY: 1.33 M/S
AV PEAK GRADIENT: 7.1 MMHG
AVA (PEAK VEL): 2.52 CM2
EJECTION FRACTION APICAL 4 CHAMBER: 44.7
EJECTION FRACTION: 43 %
LEFT ATRIUM VOLUME AREA LENGTH INDEX BSA: 37.7 ML/M2
LEFT VENTRICLE INTERNAL DIMENSION DIASTOLE: 5.07 CM (ref 3.5–6)
LEFT VENTRICULAR OUTFLOW TRACT DIAMETER: 2.3 CM
MITRAL VALVE E/A RATIO: 0.52
RIGHT VENTRICLE FREE WALL PEAK S': 15.4 CM/S
TRICUSPID ANNULAR PLANE SYSTOLIC EXCURSION: 2.3 CM

## 2024-09-18 ENCOUNTER — PATIENT OUTREACH (OUTPATIENT)
Dept: PRIMARY CARE | Facility: CLINIC | Age: 74
End: 2024-09-18
Payer: MEDICARE

## 2024-09-18 NOTE — PROGRESS NOTES
Discharge Facility: [facility]   Discharge Diagnosis: Acute Systolic Heart Failure  Essential HTN  Admission Date: 9/15/24  Discharge Date: 9/16/24    PCP Appointment Date: no appointment, message to office  Specialist Appointment Date: 9/24/24 cardiology  Hospital Encounter and Summary Linked: Yes    Two attempts were made to reach patient within two business days after discharge. Voicemail left with contact information for patient to call back with any non-emergent questions or concerns.

## 2024-09-19 LAB
ATRIAL RATE: 67 BPM
ATRIAL RATE: 76 BPM
P AXIS: 19 DEGREES
P AXIS: 39 DEGREES
P OFFSET: 152 MS
P OFFSET: 170 MS
P ONSET: 109 MS
P ONSET: 114 MS
PR INTERVAL: 192 MS
PR INTERVAL: 204 MS
Q ONSET: 210 MS
Q ONSET: 211 MS
QRS COUNT: 11 BEATS
QRS COUNT: 12 BEATS
QRS DURATION: 126 MS
QRS DURATION: 140 MS
QT INTERVAL: 432 MS
QT INTERVAL: 438 MS
QTC CALCULATION(BAZETT): 462 MS
QTC CALCULATION(BAZETT): 486 MS
QTC FREDERICIA: 454 MS
QTC FREDERICIA: 467 MS
R AXIS: -52 DEGREES
R AXIS: -57 DEGREES
T AXIS: 43 DEGREES
T AXIS: 93 DEGREES
T OFFSET: 426 MS
T OFFSET: 430 MS
VENTRICULAR RATE: 67 BPM
VENTRICULAR RATE: 76 BPM

## 2024-09-20 ENCOUNTER — PHARMACY VISIT (OUTPATIENT)
Dept: PHARMACY | Facility: CLINIC | Age: 74
End: 2024-09-20
Payer: COMMERCIAL

## 2024-09-20 DIAGNOSIS — I51.89 GRADE I DIASTOLIC DYSFUNCTION: ICD-10-CM

## 2024-09-20 DIAGNOSIS — R94.31 ABNORMAL EKG: ICD-10-CM

## 2024-09-20 DIAGNOSIS — I50.22 CHRONIC SYSTOLIC HEART FAILURE: Primary | ICD-10-CM

## 2024-09-20 DIAGNOSIS — D86.0 PULMONARY SARCOIDOSIS (MULTI): ICD-10-CM

## 2024-09-20 DIAGNOSIS — R09.89 SYMPTOM OF CONGESTIVE HEART FAILURE: ICD-10-CM

## 2024-09-20 PROCEDURE — RXMED WILLOW AMBULATORY MEDICATION CHARGE

## 2024-09-23 ENCOUNTER — LAB (OUTPATIENT)
Dept: LAB | Facility: LAB | Age: 74
End: 2024-09-23
Payer: MEDICARE

## 2024-09-23 DIAGNOSIS — I50.23 ACUTE ON CHRONIC SYSTOLIC HEART FAILURE: ICD-10-CM

## 2024-09-23 LAB
ANION GAP SERPL CALC-SCNC: 11 MMOL/L (ref 10–20)
BUN SERPL-MCNC: 18 MG/DL (ref 6–23)
CALCIUM SERPL-MCNC: 9.8 MG/DL (ref 8.6–10.3)
CHLORIDE SERPL-SCNC: 103 MMOL/L (ref 98–107)
CO2 SERPL-SCNC: 28 MMOL/L (ref 21–32)
CREAT SERPL-MCNC: 1.33 MG/DL (ref 0.5–1.3)
EGFRCR SERPLBLD CKD-EPI 2021: 55 ML/MIN/1.73M*2
GLUCOSE SERPL-MCNC: 101 MG/DL (ref 74–99)
POTASSIUM SERPL-SCNC: 4.3 MMOL/L (ref 3.5–5.3)
SODIUM SERPL-SCNC: 138 MMOL/L (ref 136–145)

## 2024-09-23 PROCEDURE — 36415 COLL VENOUS BLD VENIPUNCTURE: CPT

## 2024-09-24 ENCOUNTER — HOSPITAL ENCOUNTER (OUTPATIENT)
Dept: RADIOLOGY | Facility: EXTERNAL LOCATION | Age: 74
Discharge: HOME | End: 2024-09-24

## 2024-09-24 ENCOUNTER — APPOINTMENT (OUTPATIENT)
Dept: CARDIOLOGY | Facility: CLINIC | Age: 74
End: 2024-09-24
Payer: MEDICARE

## 2024-09-24 VITALS
SYSTOLIC BLOOD PRESSURE: 103 MMHG | WEIGHT: 227 LBS | HEIGHT: 73 IN | DIASTOLIC BLOOD PRESSURE: 69 MMHG | HEART RATE: 87 BPM | BODY MASS INDEX: 30.09 KG/M2 | OXYGEN SATURATION: 96 %

## 2024-09-24 DIAGNOSIS — I50.22 CHRONIC SYSTOLIC HEART FAILURE: ICD-10-CM

## 2024-09-24 DIAGNOSIS — D86.0 PULMONARY SARCOIDOSIS (MULTI): Primary | ICD-10-CM

## 2024-09-24 DIAGNOSIS — E78.2 MIXED HYPERLIPIDEMIA: ICD-10-CM

## 2024-09-24 DIAGNOSIS — D86.0 PULMONARY SARCOIDOSIS (MULTI): ICD-10-CM

## 2024-09-24 DIAGNOSIS — I50.42 CHRONIC COMBINED SYSTOLIC AND DIASTOLIC HEART FAILURE: ICD-10-CM

## 2024-09-24 PROBLEM — I51.89 GRADE I DIASTOLIC DYSFUNCTION: Status: RESOLVED | Noted: 2023-05-04 | Resolved: 2024-09-24

## 2024-09-24 PROCEDURE — 99215 OFFICE O/P EST HI 40 MIN: CPT | Performed by: NURSE PRACTITIONER

## 2024-09-24 PROCEDURE — G2211 COMPLEX E/M VISIT ADD ON: HCPCS | Performed by: NURSE PRACTITIONER

## 2024-09-24 PROCEDURE — 1036F TOBACCO NON-USER: CPT | Performed by: NURSE PRACTITIONER

## 2024-09-24 PROCEDURE — 1159F MED LIST DOCD IN RCRD: CPT | Performed by: NURSE PRACTITIONER

## 2024-09-24 RX ORDER — IBUPROFEN 600 MG/1
1 TABLET ORAL 4 TIMES DAILY
COMMUNITY
Start: 2024-09-10

## 2024-09-24 RX ORDER — IBUPROFEN 200 MG
200 TABLET ORAL EVERY 6 HOURS PRN
COMMUNITY

## 2024-09-24 RX ORDER — OMEPRAZOLE 20 MG/1
20 CAPSULE, DELAYED RELEASE ORAL
COMMUNITY
Start: 2024-07-24

## 2024-09-24 RX ORDER — MECLIZINE HYDROCHLORIDE 25 MG/1
25 TABLET ORAL 3 TIMES DAILY PRN
COMMUNITY
Start: 2023-01-18

## 2024-09-24 RX ORDER — ROSUVASTATIN CALCIUM 20 MG/1
20 TABLET, COATED ORAL DAILY
COMMUNITY
Start: 2023-01-18

## 2024-09-24 NOTE — PROGRESS NOTES
Primary Care Physician: Aileen Downey PA-C        Date of Visit: 09/24/2024 10:20 AM EDT  Location of visit:  [facility]   Type of Visit: Follow up             Chief Complaint   Patient presents with    Follow-up     Here for hospital follow up for heart failure        HPI / Summary:   Abhilash Escamilla is a 74 y.o. male  with pulmonary sarcoidosis,  lung cancer s/p wedge resection August 2022,  essential tremor,  BPV,  who returns for hospital  follow up       Hospitalized 9/15/2024 with new onset HF,  found to have moderately reduced LV systolic function with WMA.   Symptoms responded well to IV diuresis.    Dry weight at discharge was 228 (down from 234 on admission),  today 227 lbs     Having some dizziness and frequent urination overnight.  Otherwise,  lower extremity edema has resolved and breathing is back to baseline   He has advanced imaging scheduled in [place] next month       12 system review is negative except as noted above  Accompanied by his wife Cody who contributed to the history       Medical History:   Past Medical History:   Diagnosis Date    Genital herpes        Social History:   Tobacco Use: Medium Risk (9/24/2024)    Patient History     Smoking Tobacco Use: Former     Smokeless Tobacco Use: Never     Passive Exposure: Not on file         MEDICATIONS:   Current Outpatient Medications   Medication Instructions    atorvastatin (Lipitor) 40 mg tablet 1 tablet, oral, Nightly    ciclopirox (Penlac) 8 % solution 1 Application, Topical, Nightly, Remove with alcohol every 7 days; continue therapy until nail clearance    ibuprofen 600 mg tablet 1 tablet, oral, 4 times daily    ibuprofen 200 mg, oral, Every 6 hours PRN    Jardiance 10 mg, oral, Daily    meclizine (ANTIVERT) 25 mg, oral, 3 times daily PRN    omeprazole (PRILOSEC) 20 mg, Daily before breakfast    omeprazole OTC (PRILOSEC OTC) 20 mg, oral, Nightly, Do not crush, chew, or split.     oxygen (O2) gas therapy inhalation, Continuous    oxygen  DME/Hospice (O2) 2 L/min, inhalation, Nightly, via nasal canula<BR><BR>Sleep study shows hypoxia 88% overnight without sleep apnea.<BR>PFT no response to bronchodilator.    rosuvastatin (CRESTOR) 20 mg, oral, Daily         IMAGING REVIEWED:     Echocardiogram 9/16/2024  CONCLUSIONS:   1. Left ventricular ejection fraction is mildly decreased, by visual estimate at 40-45%.   2. There is global hypokinesis of the left ventricle with minor regional variations.   3. Spectral Doppler shows an impaired relaxation pattern of left ventricular diastolic filling.   4. There is normal right ventricular global systolic function.   5. The left atrium is mildly dilate      LABS:  CBC with Differential:    Lab Results   Component Value Date    WBC 5.6 09/16/2024    RBC 4.87 09/16/2024    HGB 15.0 09/16/2024    HCT 45.0 09/16/2024     09/16/2024    MCV 92 09/16/2024    MCH 30.8 09/16/2024    MCHC 33.3 09/16/2024    RDW 13.2 09/16/2024    NRBC 0.0 09/16/2024    LYMPHOPCT 8.6 09/15/2024    MONOPCT 13.5 09/15/2024    EOSPCT 5.5 09/15/2024    BASOPCT 1.0 09/15/2024    MONOSABS 0.69 09/15/2024    LYMPHSABS 0.44 (L) 09/15/2024    EOSABS 0.28 09/15/2024    BASOSABS 0.05 09/15/2024     CMP:    Lab Results   Component Value Date     09/23/2024    K 4.3 09/23/2024     09/23/2024    CO2 28 09/23/2024    BUN 18 09/23/2024    CREATININE 1.33 (H) 09/23/2024    GLUCOSE 101 (H) 09/23/2024    PROT 6.1 (L) 09/15/2024    CALCIUM 9.8 09/23/2024    BILITOT 1.1 09/15/2024    ALKPHOS 69 09/15/2024    AST 14 09/15/2024    ALT 11 09/15/2024     BMP:    Lab Results   Component Value Date     09/23/2024    K 4.3 09/23/2024     09/23/2024    CO2 28 09/23/2024    BUN 18 09/23/2024    CREATININE 1.33 (H) 09/23/2024    CALCIUM 9.8 09/23/2024    GLUCOSE 101 (H) 09/23/2024     Magnesium:  Lab Results   Component Value Date    MG 1.77 08/28/2023     Troponin:    Lab Results   Component Value Date    TROPHS 35 (H) 09/16/2024    TROPHS  "33 (H) 09/15/2024    TROPHS 28 (H) 09/15/2024     BNP:   Lab Results   Component Value Date     (H) 09/16/2024         Lipid Panel:  Lab Results   Component Value Date    HDL 45.8 08/28/2023    CHHDL 4.2 08/28/2023    VLDL 23 08/28/2023    TRIG 117 08/28/2023        Lab work and imaging results independently reviewed by me     Visit Vitals  /69   Pulse 87   Ht 1.854 m (6' 1\")   Wt 103 kg (227 lb)   SpO2 96%   BMI 29.95 kg/m²   Smoking Status Former   BSA 2.3 m²         EKG dated 9/15/2024 independently reviewed   SR, 67,  PVCs,  RBBB       Constitutional:       Appearance: Healthy appearance. Not in distress.   Eyes:      Conjunctiva/sclera: Conjunctivae normal.   Neck:      Vascular: JVD normal.   Pulmonary:      Effort: Pulmonary effort is normal.      Breath sounds: Normal breath sounds.   Cardiovascular:      PMI at left midclavicular line. Normal rate. Regular rhythm. Normal S1. Normal S2.       Murmurs: There is no murmur.      No rub.   Pulses:     Intact distal pulses.   Edema:     Peripheral edema absent.   Abdominal:      General: Bowel sounds are normal.   Musculoskeletal:      Cervical back: Neck supple. Skin:     General: Skin is warm and dry.   Neurological:      Mental Status: Alert and oriented to person, place and time.           Problem List Items Addressed This Visit             ICD-10-CM    Mixed hyperlipidemia E78.2    Pulmonary sarcoidosis (Multi) - Primary D86.0    Chronic combined systolic and diastolic heart failure (Multi) I50.42       Will stop Entresto with dizziness   Sodium / fluid balance -- he has been restricting both,  BMP shows SANTOS     NM PET CT cardiac sarcoid & Cardiac MRI rule out cardiac sarcoid   The case was discussed with Dr. Ruvalcaba     09/24/24 at 11:32 AM - VIDA Rodriguez-CNP      Orders:  No orders of the defined types were placed in this encounter.        Followup Appts:  Future Appointments   Date Time Provider Department Center   9/25/2024  3:00 PM " Aileen Donwey PA-C [id number] [facility]   10/31/2024  2:00 PM PHARM[facility]Y [id number]   11/5/2024  2:00 PM ANJUM [id number] [facility]   11/11/2024  8:15 AM [facility] CAIC MRI 1 [id number] [facility]   11/11/2024 10:30 AM [facility] ADMIN ROOM PET CT 1 CMCNM [facility]   11/11/2024 11:30 AM [facility] PET CT 1 [facility]NM [facility]

## 2024-09-25 ENCOUNTER — APPOINTMENT (OUTPATIENT)
Dept: PRIMARY CARE | Facility: CLINIC | Age: 74
End: 2024-09-25
Payer: MEDICARE

## 2024-09-25 DIAGNOSIS — I50.23 ACUTE ON CHRONIC SYSTOLIC HEART FAILURE: ICD-10-CM

## 2024-09-25 PROCEDURE — 99495 TRANSJ CARE MGMT MOD F2F 14D: CPT | Performed by: PHYSICIAN ASSISTANT

## 2024-09-25 PROCEDURE — 1036F TOBACCO NON-USER: CPT | Performed by: PHYSICIAN ASSISTANT

## 2024-09-25 PROCEDURE — 1159F MED LIST DOCD IN RCRD: CPT | Performed by: PHYSICIAN ASSISTANT

## 2024-09-25 PROCEDURE — 1160F RVW MEDS BY RX/DR IN RCRD: CPT | Performed by: PHYSICIAN ASSISTANT

## 2024-09-25 NOTE — PROGRESS NOTES
"Virtual or Telephone Consent    An interactive audio and video telecommunication system which permits real time communications between the patient (at the originating site) and provider (at the distant site) was utilized to provide this telehealth service.   Verbal consent was requested and obtained from Abhilash Escamilla on this date, 09/25/24 for a telehealth visit.       Subjective    HPI    Abhilash Escamilla is a 74 y.o. year old male patient with presenting to clinic with concern for   Chief Complaint   Patient presents with    Congestive Heart Failure    for Transitional Care Management visit after hospitalization    Abhilash Escamilla is presenting today for follow- up after being discharged from hospitalization 9   days ago.   The main problem requiring admission was HTN & Systolic CHF.   The discharge summary and/or Transitional Care Management documentation was reviewed. Medication reconciliation was performed as indicated via the \"[place] as Reviewed\" time stamp.       CHF Started Entresto & Jardiance  entresto dropped BP and urinating too often. Stopped Jardiance.entresto Donita Tiffanie, continues Jardiance  Leg swelling nearly gone completely. Weight is down. Mild SANTOS noted at cardiology.     CT chest  CT Angio Chest: No evidence of pulmonary embolism.  Extensive thoracic and upper abdominal adenopathy with bilateral  pulmonary nodules/masses consistent with known history of lung cancer and metastatic disease. Further evaluation/follow-up recommended. Septal thickening in both lung bases with pleural effusions could reflect superimposed CHF/fluid overload.   [Discussed PET CT cardiac NM PET CT cardiac sarcoid & Cardiac MRI rule out cardiac sarcoid   The case was discussed with Dr. Ruvalcaba]     Having [place] Team try to do PET CT cardiac for this.     GERD Pantoprazole    HLD atorvastatin    BP Readings from Last 5 Encounters:   09/24/24 103/69   09/16/24 121/77   09/15/24 160/71   05/07/24 132/62   11/07/23 124/82 "         Wt Readings from Last 5 Encounters:   09/24/24 103 kg (227 lb)   09/16/24 104 kg (228 lb 8 oz)   09/15/24 106 kg (234 lb)   05/07/24 109 kg (240 lb 9.6 oz)   11/07/23 110 kg (243 lb)         History    Patient Active Problem List   Diagnosis    Vertigo    Gastroesophageal reflux disease without esophagitis    Mixed hyperlipidemia    Pulmonary sarcoidosis (Multi)    Pulmonary hypertension (Multi)    Non-small cell cancer of left lung (Multi)    Symptom of congestive heart failure    Chronic combined systolic and diastolic heart failure (Multi)       Past Medical History:   Diagnosis Date    Genital herpes       Past Surgical History:   Procedure Laterality Date    CHOLECYSTECTOMY      CT LUNG MEDIASTINUM BIOPSY Left 07/22/2022    [place]- unknown result- sarcoidosis vs malignancy    LUNG LOBECTOMY Left     VATS and BRYCE resection 8/2022      Family History   Problem Relation Name Age of Onset    Breast cancer Parent        Social History     Tobacco Use    Smoking status: Former     Current packs/day: 1.00     Average packs/day: 1 pack/day for 16.0 years (16.0 ttl pk-yrs)     Types: Cigarettes    Smokeless tobacco: Never    Tobacco comments:     Quit 1990   Substance Use Topics    Alcohol use: Not Currently     Alcohol/week: 5.0 standard drinks of alcohol     Types: 5 Standard drinks or equivalent per week     Comment: a few oz liquor every other day          Current Outpatient Medications:     atorvastatin (Lipitor) 40 mg tablet, Take 1 tablet (40 mg) by mouth once daily at bedtime., Disp: , Rfl:     empagliflozin (Jardiance) 10 mg, Take 1 tablet (10 mg) by mouth once daily., Disp: 90 tablet, Rfl: 3    ibuprofen 200 mg tablet, Take 1 tablet (200 mg) by mouth every 6 hours if needed., Disp: , Rfl:     ibuprofen 600 mg tablet, Take 1 tablet (600 mg) by mouth 4 times a day., Disp: , Rfl:     meclizine (Antivert) 25 mg tablet, Take 1 tablet (25 mg) by mouth 3 times a day as needed., Disp: , Rfl:     omeprazole  (PriLOSEC) 20 mg DR capsule, 1 capsule (20 mg) once daily in the morning. Take before meals., Disp: , Rfl:     omeprazole OTC (PriLOSEC OTC) 20 mg EC tablet, Take 1 tablet (20 mg) by mouth once daily at bedtime. Do not crush, chew, or split., Disp: , Rfl:     oxygen (O2) gas therapy, Inhale continuously., Disp: , Rfl:     oxygen (O2) therapy, Inhale 2 L/min at 120,000 mL/hr once daily at bedtime. via nasal canula  Sleep study shows hypoxia 88% overnight without sleep apnea. PFT no response to bronchodilator., Disp: , Rfl:     rosuvastatin (Crestor) 20 mg tablet, Take 1 tablet (20 mg) by mouth once daily., Disp: , Rfl:      Review of Systems    Constitutional: Denies fever  HEENT: Denies ST, earache  CVS: Denies Chest pain  Pulmonary: Denies wheezing, SOB  GI: Denies N/V  : Denies dysuria  Musculoskeletal:  Denies myalgia  Neuro: Denies focal weakness or numbness.  Skin: Denies Rashes.  *Review of Systems is negative unless otherwise mentioned in HPI or ROS above.    Objective    .VITALS  Pt does not have vitals available at time of visit.    Physical Exam     Limited physical exam in virtual platform  Nontoxic. No acute distress.  Nonlabored breathing. Speaking full sentences without difficulty       Plan    Current Outpatient Medications:     atorvastatin (Lipitor) 40 mg tablet, Take 1 tablet (40 mg) by mouth once daily at bedtime., Disp: , Rfl:     empagliflozin (Jardiance) 10 mg, Take 1 tablet (10 mg) by mouth once daily., Disp: 90 tablet, Rfl: 3    ibuprofen 200 mg tablet, Take 1 tablet (200 mg) by mouth every 6 hours if needed., Disp: , Rfl:     ibuprofen 600 mg tablet, Take 1 tablet (600 mg) by mouth 4 times a day., Disp: , Rfl:     meclizine (Antivert) 25 mg tablet, Take 1 tablet (25 mg) by mouth 3 times a day as needed., Disp: , Rfl:     omeprazole (PriLOSEC) 20 mg DR capsule, 1 capsule (20 mg) once daily in the morning. Take before meals., Disp: , Rfl:     omeprazole OTC (PriLOSEC OTC) 20 mg EC tablet,  Take 1 tablet (20 mg) by mouth once daily at bedtime. Do not crush, chew, or split., Disp: , Rfl:     oxygen (O2) gas therapy, Inhale continuously., Disp: , Rfl:     oxygen (O2) therapy, Inhale 2 L/min at 120,000 mL/hr once daily at bedtime. via nasal canula  Sleep study shows hypoxia 88% overnight without sleep apnea. PFT no response to bronchodilator., Disp: , Rfl:     rosuvastatin (Crestor) 20 mg tablet, Take 1 tablet (20 mg) by mouth once daily., Disp: , Rfl:        MDM    Assessment    Problem List Items Addressed This Visit    None  Visit Diagnoses       Acute on chronic systolic heart failure (Multi)        Relevant Medications    empagliflozin (Jardiance) 10 mg

## 2024-09-26 ENCOUNTER — TELEPHONE (OUTPATIENT)
Dept: ADMINISTRATIVE | Facility: CLINIC | Age: 74
End: 2024-09-26
Payer: MEDICARE

## 2024-09-26 NOTE — TELEPHONE ENCOUNTER
Left message on voicemail.  Will attempt to call on a different day.  SANAZ HyattN, [facility] RN.

## 2024-10-01 ENCOUNTER — TELEPHONE (OUTPATIENT)
Dept: ADMINISTRATIVE | Facility: CLINIC | Age: 74
End: 2024-10-01
Payer: MEDICARE

## 2024-10-01 NOTE — TELEPHONE ENCOUNTER
"Are you weighing yourself daily? \"Yes\"    Have you gained two or more pounds in a day or 3-5 pounds in a week? \"No\"    Are you following a low sodium diet? \"Yes\"    Have you cut back on your salk intake? \"Yes\"    Are you following your fluid restriction? \"Yes\"    What is your restriction? \"1800\"    Are your doctor follow up appointments made?  \"Yes\"    Do you have transportation to these appointments? \"Yes\"    Did you get your medications from the pharmacy picked up?  \"Yes\"    Do you know what your medications are for? \"Yes\"    Are you taking your medications as ordered? \"Yes\"    Are you experiencing an increase in swelling to you feet, ankles, legs, or stomach?  \"No\"    Are you using the tools that were provided to you during your H bedside visit?  \"Yes\"    Do you have any questions, concerns, or is there anything you would like to talk about?  \"No\"    NARENDRA Hyatt, [facility] RN.   "

## 2024-10-04 ENCOUNTER — PATIENT OUTREACH (OUTPATIENT)
Dept: PRIMARY CARE | Facility: CLINIC | Age: 74
End: 2024-10-04
Payer: MEDICARE

## 2024-10-04 NOTE — PROGRESS NOTES
"Late Entry    S: 28 y.o.  at 37w0d doing well. Denies HA/blurry vision/RUQ/epigastric pain. No CP/SOB.      O:  /83   Pulse 88   Temp 98.8 °F (37.1 °C)   Resp 16   Ht 5' 6" (1.676 m)   Wt 111.2 kg (245 lb 2.4 oz)   LMP 2021 (Exact Date)   SpO2 (!) 94%   Breastfeeding No   BMI 39.57 kg/m²     FHT: cat 1  [place]: Q 5 min  SVE: 1/thick, posterior. CRB placed with speculum, ringed forceps without difficulty and filled 10/10. Will continue to slowly increase as tolerated      ASSESSMENT: 37w0d   Patient Active Problem List   Diagnosis    Dysphonia    Left ovarian cyst    GERD (gastroesophageal reflux disease)    Endometriosis    Moderate persistent asthma without complication    Moderate episode of recurrent major depressive disorder    Migraine    Chronic back pain    Class 2 obesity without serious comorbidity in adult    Cyanocobalamin deficiency    Medication overuse headache    PMDD (premenstrual dysphoric disorder)    Numbness and tingling    Impaired functional mobility, balance, gait, and endurance    Chronic pain    Weakness of both hips    Weakness of trunk musculature    Decreased activities of daily living (ADL)    Decreased strength of upper extremity    ADD (attention deficit disorder)    Encounter for supervision of normal first pregnancy in first trimester    Shortness of breath    Gestational hypertension, third trimester       PLAN:    -Continue Close Maternal/Fetal Monitoring  -Recheck 4 hours or PRN  -GHTN- BP stable no meds currently  - Asthma stable  - s/p cytotec x 2 - proceed with 3rd dose    Glory Fink MD, FACOG  OB/GYN        " Call regarding appt. with PCP on (DATE) after hospitalization.  At time of outreach call the patient feels as if their condition has (improved) since last visit.  Reviewed the PCP appointment with the pt and addressed any questions or concerns.  CHF education reviewed, patient verbalizes understanding. He is weighing daily, monitoring sodium intake, taking medications. He states swelling is improved, no shortness of breath, weight remains the same.

## 2024-10-30 ENCOUNTER — PATIENT OUTREACH (OUTPATIENT)
Dept: PRIMARY CARE | Facility: CLINIC | Age: 74
End: 2024-10-30
Payer: MEDICARE

## 2024-10-31 ENCOUNTER — APPOINTMENT (OUTPATIENT)
Dept: PHARMACY | Facility: HOSPITAL | Age: 74
End: 2024-10-31
Payer: MEDICARE

## 2024-10-31 DIAGNOSIS — I50.23 ACUTE ON CHRONIC SYSTOLIC HEART FAILURE: ICD-10-CM

## 2024-10-31 DIAGNOSIS — I51.89 GRADE I DIASTOLIC DYSFUNCTION: ICD-10-CM

## 2024-10-31 DIAGNOSIS — R09.89 SYMPTOM OF CONGESTIVE HEART FAILURE: ICD-10-CM

## 2024-11-04 PROCEDURE — RXMED WILLOW AMBULATORY MEDICATION CHARGE

## 2024-11-05 ENCOUNTER — PHARMACY VISIT (OUTPATIENT)
Dept: PHARMACY | Facility: CLINIC | Age: 74
End: 2024-11-05
Payer: COMMERCIAL

## 2024-11-05 ENCOUNTER — APPOINTMENT (OUTPATIENT)
Dept: PRIMARY CARE | Facility: CLINIC | Age: 74
End: 2024-11-05
Payer: MEDICARE

## 2024-11-05 VITALS
TEMPERATURE: 97.5 F | OXYGEN SATURATION: 94 % | WEIGHT: 228 LBS | BODY MASS INDEX: 30.08 KG/M2 | DIASTOLIC BLOOD PRESSURE: 72 MMHG | SYSTOLIC BLOOD PRESSURE: 124 MMHG | HEART RATE: 58 BPM

## 2024-11-05 DIAGNOSIS — I42.8 NICM (NONISCHEMIC CARDIOMYOPATHY) (MULTI): ICD-10-CM

## 2024-11-05 DIAGNOSIS — D86.0 PULMONARY SARCOIDOSIS (MULTI): ICD-10-CM

## 2024-11-05 DIAGNOSIS — C34.92 NON-SMALL CELL CANCER OF LEFT LUNG (MULTI): ICD-10-CM

## 2024-11-05 DIAGNOSIS — D86.85 CARDIAC SARCOIDOSIS: Primary | ICD-10-CM

## 2024-11-05 PROCEDURE — 1036F TOBACCO NON-USER: CPT | Performed by: PHYSICIAN ASSISTANT

## 2024-11-05 PROCEDURE — 1159F MED LIST DOCD IN RCRD: CPT | Performed by: PHYSICIAN ASSISTANT

## 2024-11-05 PROCEDURE — 99213 OFFICE O/P EST LOW 20 MIN: CPT | Performed by: PHYSICIAN ASSISTANT

## 2024-11-05 RX ORDER — LOSARTAN POTASSIUM 25 MG/1
1 TABLET ORAL DAILY
COMMUNITY
Start: 2024-10-30

## 2024-11-05 ASSESSMENT — PATIENT HEALTH QUESTIONNAIRE - PHQ9
SUM OF ALL RESPONSES TO PHQ9 QUESTIONS 1 AND 2: 0
1. LITTLE INTEREST OR PLEASURE IN DOING THINGS: NOT AT ALL
2. FEELING DOWN, DEPRESSED OR HOPELESS: NOT AT ALL

## 2024-11-05 NOTE — PROGRESS NOTES
Subjective     HPI   Abhilash Escamilla is a 74 y.o. year old male patient with presenting to clinic with concern for   Chief Complaint   Patient presents with    Follow-up     6 month    Labs [facility]/25/24 from [facility]      Cataracts- had surgery this summer    CHF on Jardiance  entresto dropped BP and urinating too often. . Continues jardiance    Wt Readings from Last 5 Encounters:   11/05/24 103 kg (228 lb)   09/24/24 103 kg (227 lb)   09/16/24 104 kg (228 lb 8 oz)   09/15/24 106 kg (234 lb)   05/07/24 109 kg (240 lb 9.6 oz)       HTN-  BP Readings from Last 5 Encounters:   11/05/24 124/72   09/24/24 103/69   09/16/24 121/77   09/15/24 160/71   05/07/24 132/62     GERD Pantoprazole    HLD atorvastatin    Benign essential tremor    Edema (1+ pitting bilat KG)- improved on jardiance    CKD 3a    Vertigo/BPV      Sarcoidosis- PET scan whole body upcoming in a few weeks    Started seeing [facility] cardiology Dr Huerta   newly recognized cardiomyopathy  -cardiac MRI reduced systolic function EF 36%   and patchy late gadolinium enhancement likely representative of cardiac sarcoidosis.   - started on losartan 25      Sarcoidosis  Non-small cell cancer of left lung     CT Angio Chest: No evidence of pulmonary embolism.  Extensive thoracic and upper abdominal adenopathy with bilateral  pulmonary nodules/masses consistent with known history of lung cancer and metastatic disease. Further evaluation/follow-up recommended. Septal thickening in both lung bases with pleural effusions could reflect superimposed CHF/fluid overload.   [Discussed PET CT cardiac NM PET CT cardiac sarcoid & Cardiac MRI rule out cardiac sarcoid   The case was discussed with Dr. Ruvalcaba]   [place] Team try to do PET CT cardiac for this.     Seymour Viramontes MD - 03/08/2024 10:10 AM EST   transbronchial biopsy diagnosis of sarcoidosis at [facility] 20 years ago. CT last year with new left upper lobe nodule than biopsy was consistent  with squamous cell cancer. He was status post resection with Dr. Moseley. He notes some restriction but still able to do more slowly gaining strength and exercising. CT scan done today prior to his visit by my review shows no change in his nodules final report pending from cardiology. Left hilum seems to be better expanded     74-year-old male referral from Dr. Haro for lung mass.  Followed by pulmonary for several years for sarcoidosis with yearly CT imaging.   CT chest 5/4/22 - mediastinal and hilar adenopathy mostly calcified and is stable, BRYCE mass 3.7 x 2.1 x 2.1 cm.    PET CT 6/15/22 - maximum SUV in lymph nodes is SUV 4.2 in paraesophageal LN and SUV 4.4 in right perihilum, SUV of the BRYCE mass is 2.7, gastrohepatic LN with SUV 3.9.     Brain MRI 6/15/22 negative for metastatic lesions.     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 Procedure c/b pneumothorax managed with pigtail placement.     PFT's from 2020 showed FEV1 of 3.18 L, 98%; DLCO 65%.   23 pack/year smoker quit 30 years ago, 1 ETOH drink a day.   Has stopped drinking alcohol entirely.            Patient Active Problem List   Diagnosis    Gastroesophageal reflux disease without esophagitis    Mixed hyperlipidemia    Pulmonary sarcoidosis (Multi)    Pulmonary hypertension (Multi)    Non-small cell cancer of left lung (Multi)    Chronic combined systolic and diastolic heart failure    Cardiac sarcoidosis    Benign positional vertigo, right    Benign essential tremor    NICM (nonischemic cardiomyopathy) (Multi)       Past Medical History:   Diagnosis Date    Genital herpes     S/P TKR (total knee replacement), right 03/31/2021      Past Surgical History:   Procedure Laterality Date    CHOLECYSTECTOMY      CT LUNG MEDIASTINUM BIOPSY Left 07/22/2022    [place]- unknown result- sarcoidosis vs malignancy    LUNG LOBECTOMY Left     VATS and BRYCE resection 8/2022    TOTAL HIP ARTHROPLASTY      TOTAL  KNEE ARTHROPLASTY        Family History   Problem Relation Name Age of Onset    Breast cancer Parent        Social History     Tobacco Use    Smoking status: Former     Current packs/day: 1.00     Average packs/day: 1 pack/day for 16.0 years (16.0 ttl pk-yrs)     Types: Cigarettes    Smokeless tobacco: Never    Tobacco comments:     Quit 1990   Substance Use Topics    Alcohol use: Not Currently     Alcohol/week: 5.0 standard drinks of alcohol     Types: 5 Standard drinks or equivalent per week     Comment: a few oz liquor every other day        Current Outpatient Medications:     atorvastatin (Lipitor) 40 mg tablet, Take 1 tablet (40 mg) by mouth once daily at bedtime., Disp: , Rfl:     empagliflozin (Jardiance) 10 mg, Take 1 tablet (10 mg) by mouth once daily., Disp: 90 tablet, Rfl: 3    ibuprofen 200 mg tablet, Take 1 tablet (200 mg) by mouth every 6 hours if needed., Disp: , Rfl:     ibuprofen 600 mg tablet, Take 1 tablet (600 mg) by mouth 4 times a day., Disp: , Rfl:     losartan (Cozaar) 25 mg tablet, Take 1 tablet (25 mg) by mouth once daily., Disp: , Rfl:     meclizine (Antivert) 25 mg tablet, Take 1 tablet (25 mg) by mouth 3 times a day as needed., Disp: , Rfl:     omeprazole (PriLOSEC) 20 mg DR capsule, 1 capsule (20 mg) once daily in the morning. Take before meals., Disp: , Rfl:     omeprazole OTC (PriLOSEC OTC) 20 mg EC tablet, Take 1 tablet (20 mg) by mouth once daily at bedtime. Do not crush, chew, or split., Disp: , Rfl:     oxygen (O2) gas therapy, Inhale continuously., Disp: , Rfl:     oxygen (O2) therapy, Inhale 2 L/min at 120,000 mL/hr once daily at bedtime. via nasal canula  Sleep study shows hypoxia 88% overnight without sleep apnea. PFT no response to bronchodilator., Disp: , Rfl:     rosuvastatin (Crestor) 20 mg tablet, Take 1 tablet (20 mg) by mouth once daily., Disp: , Rfl:      Review of Systems  Constitutional: Denies fever  HEENT: Denies ST, earache  CVS: Denies Chest pain  Pulmonary:  Denies wheezing, SOB  GI: Denies N/V  : Denies dysuria  Musculoskeletal:  Denies myalgia  Neuro: Denies focal weakness or numbness.  Skin: Denies Rashes.  *Review of Systems is negative unless otherwise mentioned in HPI or ROS above.    Objective   /72   Pulse 58   Temp 36.4 °C (97.5 °F)   Wt 103 kg (228 lb)   SpO2 94%   BMI 30.08 kg/m²  reviewed Body mass index is 30.08 kg/m².     Physical Exam  Constitutional: NAD.  Resting comfortably.  Head: Atraumatic, normocephalic.  ENT: Moist oral mucosa. Nasal mucosa wnl.   Cardiac: Regular rate & rhythm.   Pulmonary: Lungs clear bilat  GI: Soft, Nontender, nondistended.   Musculoskeletal: 1+ peripheral edema.   Skin: No evidence of trauma. No rashes  Psych: Intact judgement and insight.    .Assessment/Plan   Problem List Items Addressed This Visit             ICD-10-CM    Pulmonary sarcoidosis (Multi) D86.0    Non-small cell cancer of left lung (Multi) C34.92     Seymour Viramontes MD - 03/08/2024 10:10 AM EST   transbronchial biopsy diagnosis of sarcoidosis at [facility] years ago. CT last year with new left upper lobe nodule than biopsy was consistent with squamous cell cancer. He was status post resection with Dr. Moseley.   10/2024 CT Angio Chest: No evidence of pulmonary embolism.  Extensive thoracic and upper abdominal adenopathy with bilateral  pulmonary nodules/masses consistent with known history of lung cancer and metastatic disease. Further evaluation/follow-up recommended. Septal thickening in both lung bases with pleural effusions could reflect superimposed CHF/fluid overload.          Cardiac sarcoidosis - Primary D86.85    NICM (nonischemic cardiomyopathy) (Multi) I42.8     Currently seeing [facility] cardiology team- this is related to cardiac sarcoidosis.

## 2024-11-06 PROBLEM — Z96.651 S/P TKR (TOTAL KNEE REPLACEMENT), RIGHT: Status: RESOLVED | Noted: 2021-03-31 | Resolved: 2024-11-06

## 2024-11-06 PROBLEM — R09.89 SYMPTOM OF CONGESTIVE HEART FAILURE: Status: RESOLVED | Noted: 2024-09-15 | Resolved: 2024-11-06

## 2024-11-06 PROBLEM — H81.11 BENIGN POSITIONAL VERTIGO, RIGHT: Status: ACTIVE | Noted: 2022-03-21

## 2024-11-06 PROBLEM — G25.0 BENIGN ESSENTIAL TREMOR: Status: ACTIVE | Noted: 2022-03-21

## 2024-11-06 PROBLEM — R42 VERTIGO: Status: RESOLVED | Noted: 2023-05-04 | Resolved: 2024-11-06

## 2024-11-06 PROBLEM — I42.8 NICM (NONISCHEMIC CARDIOMYOPATHY) (MULTI): Status: ACTIVE | Noted: 2024-11-01

## 2024-11-06 PROBLEM — D86.85 CARDIAC SARCOIDOSIS: Status: ACTIVE | Noted: 2024-11-06

## 2024-11-06 NOTE — ASSESSMENT & PLAN NOTE
Currently seeing [facility] cardiology team- this is related to cardiac sarcoidosis.

## 2024-11-06 NOTE — ASSESSMENT & PLAN NOTE
Seymour Viramontes MD - 03/08/2024 10:10 AM EST   transbronchial biopsy diagnosis of sarcoidosis at [facility] 20 years ago. CT last year with new left upper lobe nodule than biopsy was consistent with squamous cell cancer. He was status post resection with Dr. Moseley.   10/2024 CT Angio Chest: No evidence of pulmonary embolism.  Extensive thoracic and upper abdominal adenopathy with bilateral  pulmonary nodules/masses consistent with known history of lung cancer and metastatic disease. Further evaluation/follow-up recommended. Septal thickening in both lung bases with pleural effusions could reflect superimposed CHF/fluid overload.

## 2024-11-11 ENCOUNTER — APPOINTMENT (OUTPATIENT)
Dept: RADIOLOGY | Facility: HOSPITAL | Age: 74
End: 2024-11-11
Payer: MEDICARE

## 2024-11-11 ENCOUNTER — HOSPITAL ENCOUNTER (OUTPATIENT)
Dept: RADIOLOGY | Facility: HOSPITAL | Age: 74
End: 2024-11-11
Payer: MEDICARE

## 2024-11-25 DIAGNOSIS — I50.23 ACUTE ON CHRONIC SYSTOLIC HEART FAILURE: ICD-10-CM

## 2024-11-25 DIAGNOSIS — R09.89 SYMPTOM OF CONGESTIVE HEART FAILURE: ICD-10-CM

## 2024-11-25 DIAGNOSIS — I51.89 GRADE I DIASTOLIC DYSFUNCTION: Primary | ICD-10-CM

## 2024-12-17 ENCOUNTER — PATIENT OUTREACH (OUTPATIENT)
Dept: PRIMARY CARE | Facility: CLINIC | Age: 74
End: 2024-12-17
Payer: MEDICARE

## 2024-12-17 NOTE — PROGRESS NOTES
Final call. Called and spoke with patient to address any questions or concerns regarding hospitalization.   Patient reports their condition has (improved).

## 2024-12-24 ENCOUNTER — LAB (OUTPATIENT)
Dept: LAB | Facility: LAB | Age: 74
End: 2024-12-24
Payer: MEDICARE

## 2024-12-24 DIAGNOSIS — D86.9 SARCOIDOSIS, UNSPECIFIED: Primary | ICD-10-CM

## 2024-12-24 LAB
ALBUMIN SERPL BCP-MCNC: 4.1 G/DL (ref 3.4–5)
ALP SERPL-CCNC: 56 U/L (ref 33–136)
ALT SERPL W P-5'-P-CCNC: 26 U/L (ref 10–52)
ANION GAP SERPL CALC-SCNC: 12 MMOL/L (ref 10–20)
AST SERPL W P-5'-P-CCNC: 19 U/L (ref 9–39)
BASOPHILS # BLD AUTO: 0.03 X10*3/UL (ref 0–0.1)
BASOPHILS NFR BLD AUTO: 0.3 %
BILIRUB SERPL-MCNC: 1.3 MG/DL (ref 0–1.2)
BUN SERPL-MCNC: 26 MG/DL (ref 6–23)
CALCIUM SERPL-MCNC: 9.2 MG/DL (ref 8.6–10.3)
CHLORIDE SERPL-SCNC: 105 MMOL/L (ref 98–107)
CO2 SERPL-SCNC: 29 MMOL/L (ref 21–32)
CREAT SERPL-MCNC: 1.32 MG/DL (ref 0.5–1.3)
EGFRCR SERPLBLD CKD-EPI 2021: 56 ML/MIN/1.73M*2
EOSINOPHIL # BLD AUTO: 0.14 X10*3/UL (ref 0–0.4)
EOSINOPHIL NFR BLD AUTO: 1.6 %
ERYTHROCYTE [DISTWIDTH] IN BLOOD BY AUTOMATED COUNT: 13.5 % (ref 11.5–14.5)
GLUCOSE SERPL-MCNC: 97 MG/DL (ref 74–99)
HCT VFR BLD AUTO: 48.7 % (ref 41–52)
HGB BLD-MCNC: 16.1 G/DL (ref 13.5–17.5)
IMM GRANULOCYTES # BLD AUTO: 0.06 X10*3/UL (ref 0–0.5)
IMM GRANULOCYTES NFR BLD AUTO: 0.7 % (ref 0–0.9)
LYMPHOCYTES # BLD AUTO: 0.99 X10*3/UL (ref 0.8–3)
LYMPHOCYTES NFR BLD AUTO: 11.5 %
MCH RBC QN AUTO: 31.3 PG (ref 26–34)
MCHC RBC AUTO-ENTMCNC: 33.1 G/DL (ref 32–36)
MCV RBC AUTO: 95 FL (ref 80–100)
MONOCYTES # BLD AUTO: 0.73 X10*3/UL (ref 0.05–0.8)
MONOCYTES NFR BLD AUTO: 8.5 %
NEUTROPHILS # BLD AUTO: 6.66 X10*3/UL (ref 1.6–5.5)
NEUTROPHILS NFR BLD AUTO: 77.4 %
NRBC BLD-RTO: 0 /100 WBCS (ref 0–0)
PLATELET # BLD AUTO: 277 X10*3/UL (ref 150–450)
POTASSIUM SERPL-SCNC: 3.7 MMOL/L (ref 3.5–5.3)
PROT SERPL-MCNC: 6.3 G/DL (ref 6.4–8.2)
RBC # BLD AUTO: 5.14 X10*6/UL (ref 4.5–5.9)
SODIUM SERPL-SCNC: 142 MMOL/L (ref 136–145)
WBC # BLD AUTO: 8.6 X10*3/UL (ref 4.4–11.3)

## 2025-01-13 ENCOUNTER — LAB (OUTPATIENT)
Dept: LAB | Facility: LAB | Age: 75
End: 2025-01-13
Payer: MEDICARE

## 2025-01-13 DIAGNOSIS — D86.9 SARCOIDOSIS, UNSPECIFIED: ICD-10-CM

## 2025-01-13 LAB
ALBUMIN SERPL BCP-MCNC: 4.2 G/DL (ref 3.4–5)
ALP SERPL-CCNC: 61 U/L (ref 33–136)
ALT SERPL W P-5'-P-CCNC: 39 U/L (ref 10–52)
ANION GAP SERPL CALC-SCNC: 15 MMOL/L (ref 10–20)
AST SERPL W P-5'-P-CCNC: 24 U/L (ref 9–39)
BASOPHILS # BLD AUTO: 0.01 X10*3/UL (ref 0–0.1)
BASOPHILS NFR BLD AUTO: 0.2 %
BILIRUB SERPL-MCNC: 1.4 MG/DL (ref 0–1.2)
BUN SERPL-MCNC: 24 MG/DL (ref 6–23)
CALCIUM SERPL-MCNC: 9.3 MG/DL (ref 8.6–10.3)
CHLORIDE SERPL-SCNC: 103 MMOL/L (ref 98–107)
CO2 SERPL-SCNC: 24 MMOL/L (ref 21–32)
CREAT SERPL-MCNC: 1.29 MG/DL (ref 0.5–1.3)
EGFRCR SERPLBLD CKD-EPI 2021: 57 ML/MIN/1.73M*2
EOSINOPHIL # BLD AUTO: 0.01 X10*3/UL (ref 0–0.4)
EOSINOPHIL NFR BLD AUTO: 0.2 %
ERYTHROCYTE [DISTWIDTH] IN BLOOD BY AUTOMATED COUNT: 13.6 % (ref 11.5–14.5)
GLUCOSE SERPL-MCNC: 114 MG/DL (ref 74–99)
HCT VFR BLD AUTO: 46.8 % (ref 41–52)
HGB BLD-MCNC: 15.5 G/DL (ref 13.5–17.5)
IMM GRANULOCYTES # BLD AUTO: 0.03 X10*3/UL (ref 0–0.5)
IMM GRANULOCYTES NFR BLD AUTO: 0.5 % (ref 0–0.9)
LYMPHOCYTES # BLD AUTO: 0.18 X10*3/UL (ref 0.8–3)
LYMPHOCYTES NFR BLD AUTO: 2.9 %
MCH RBC QN AUTO: 31.2 PG (ref 26–34)
MCHC RBC AUTO-ENTMCNC: 33.1 G/DL (ref 32–36)
MCV RBC AUTO: 94 FL (ref 80–100)
MONOCYTES # BLD AUTO: 0.19 X10*3/UL (ref 0.05–0.8)
MONOCYTES NFR BLD AUTO: 3 %
NEUTROPHILS # BLD AUTO: 5.81 X10*3/UL (ref 1.6–5.5)
NEUTROPHILS NFR BLD AUTO: 93.2 %
NRBC BLD-RTO: 0 /100 WBCS (ref 0–0)
PLATELET # BLD AUTO: 224 X10*3/UL (ref 150–450)
POTASSIUM SERPL-SCNC: 4.3 MMOL/L (ref 3.5–5.3)
PROT SERPL-MCNC: 6.6 G/DL (ref 6.4–8.2)
RBC # BLD AUTO: 4.97 X10*6/UL (ref 4.5–5.9)
SODIUM SERPL-SCNC: 138 MMOL/L (ref 136–145)
WBC # BLD AUTO: 6.2 X10*3/UL (ref 4.4–11.3)

## 2025-01-27 DIAGNOSIS — I50.23 ACUTE ON CHRONIC SYSTOLIC HEART FAILURE: ICD-10-CM

## 2025-01-27 DIAGNOSIS — I51.89 GRADE I DIASTOLIC DYSFUNCTION: Primary | ICD-10-CM

## 2025-01-27 DIAGNOSIS — R09.89 SYMPTOM OF CONGESTIVE HEART FAILURE: ICD-10-CM

## 2025-01-27 PROCEDURE — RXMED WILLOW AMBULATORY MEDICATION CHARGE

## 2025-02-03 ENCOUNTER — OFFICE VISIT (OUTPATIENT)
Dept: URGENT CARE | Age: 75
End: 2025-02-03
Payer: MEDICARE

## 2025-02-03 ENCOUNTER — APPOINTMENT (OUTPATIENT)
Dept: PHARMACY | Facility: HOSPITAL | Age: 75
End: 2025-02-03
Payer: MEDICARE

## 2025-02-03 VITALS
RESPIRATION RATE: 16 BRPM | BODY MASS INDEX: 29.86 KG/M2 | SYSTOLIC BLOOD PRESSURE: 148 MMHG | DIASTOLIC BLOOD PRESSURE: 75 MMHG | HEIGHT: 73 IN | TEMPERATURE: 97.5 F | HEART RATE: 59 BPM | OXYGEN SATURATION: 95 % | WEIGHT: 225.31 LBS

## 2025-02-03 DIAGNOSIS — N30.01 ACUTE CYSTITIS WITH HEMATURIA: Primary | ICD-10-CM

## 2025-02-03 DIAGNOSIS — R30.9 PAINFUL URINATION: ICD-10-CM

## 2025-02-03 LAB
POC APPEARANCE, URINE: CLEAR
POC BILIRUBIN, URINE: NEGATIVE
POC BLOOD, URINE: ABNORMAL
POC COLOR, URINE: YELLOW
POC GLUCOSE, URINE: ABNORMAL MG/DL
POC KETONES, URINE: NEGATIVE MG/DL
POC LEUKOCYTES, URINE: NEGATIVE
POC NITRITE,URINE: NEGATIVE
POC PH, URINE: 6 PH
POC PROTEIN, URINE: NEGATIVE MG/DL
POC SPECIFIC GRAVITY, URINE: 1.02
POC UROBILINOGEN, URINE: 0.2 EU/DL

## 2025-02-03 RX ORDER — CEPHALEXIN 500 MG/1
500 CAPSULE ORAL 4 TIMES DAILY
Qty: 28 CAPSULE | Refills: 0 | Status: SHIPPED | OUTPATIENT
Start: 2025-02-03 | End: 2025-02-10

## 2025-02-03 NOTE — PROGRESS NOTES
Subjective   Patient ID: Abhilash Escamilla is a 75 y.o. male. They present today with a chief complaint of Urinary Frequency (5 days.) and Painful urination.    History of Present Illness  75-year-old male presents urgent care for complaint of urinary frequency/urgency and dysuria for the past 5 days.  States has had UTI in the past about 12 years ago and states this feels exactly same.  Denies any current fevers or chills, nausea, vomiting, sweats, chest pain, shortness of breath, abdominal pain, gross hematuria, constipation or diarrhea, penile discharge/bleeding, testicular pain, flank pain or back pain.  Denies history or concern for STDs.  Denies recent Vigil catheter placement.  Denies any known drug allergies.  UA shows small blood and glucose.  States he recently had blood work done and states he is not diabetic and declines fingerstick glucose at this time.  Urine culture pending.  Prescribed cephalexin.  Educated on supportive care.  Follow-up PCP 1 to 2 weeks.  Return/ER precautions.  Patient agrees with plan.          Past Medical History  Allergies as of 02/03/2025    (No Known Allergies)       (Not in a hospital admission)       Past Medical History:   Diagnosis Date    Genital herpes     S/P TKR (total knee replacement), right 03/31/2021       Past Surgical History:   Procedure Laterality Date    CHOLECYSTECTOMY      CT LUNG MEDIASTINUM BIOPSY Left 07/22/2022    [place]- unknown result- sarcoidosis vs malignancy    LUNG LOBECTOMY Left     VATS and BRYCE resection 8/2022    TOTAL HIP ARTHROPLASTY      TOTAL KNEE ARTHROPLASTY          reports that he has quit smoking. His smoking use included cigarettes. He has a 16 pack-year smoking history. He has never used smokeless tobacco. He reports that he does not currently use alcohol after a past usage of about 5.0 standard drinks of alcohol per week. He reports that he does not use drugs.    Review of Systems  Review of Systems   All other systems reviewed and  "are negative.                                 Objective    Vitals:    02/03/25 1606   BP: 148/75   Pulse: 59   Resp: 16   Temp: 36.4 °C (97.5 °F)   SpO2: 95%   Weight: 102 kg (225 lb 5 oz)   Height: 1.854 m (6' 1\")     No LMP for male patient.    Physical Exam  Vitals reviewed.   Constitutional:       General: He is not in acute distress.     Appearance: Normal appearance. He is not ill-appearing, toxic-appearing or diaphoretic.   HENT:      Head: Normocephalic and atraumatic.      Nose: Nose normal.      Mouth/Throat:      Mouth: Mucous membranes are moist.      Pharynx: Oropharynx is clear.   Cardiovascular:      Rate and Rhythm: Normal rate and regular rhythm.   Pulmonary:      Effort: Pulmonary effort is normal. No respiratory distress.      Breath sounds: Normal breath sounds. No stridor. No wheezing, rhonchi or rales.   Abdominal:      General: Abdomen is flat.      Palpations: Abdomen is soft.      Tenderness: There is no abdominal tenderness. There is no right CVA tenderness or left CVA tenderness.   Musculoskeletal:      Cervical back: Normal range of motion and neck supple. No rigidity or tenderness.   Lymphadenopathy:      Cervical: No cervical adenopathy.   Skin:     General: Skin is warm and dry.   Neurological:      General: No focal deficit present.      Mental Status: He is alert and oriented to person, place, and time.   Psychiatric:         Mood and Affect: Mood normal.         Behavior: Behavior normal.         Procedures    Point of Care Test & Imaging Results from this visit  No results found for this visit on 02/03/25.   No results found.    Diagnostic study results (if any) were reviewed by Tashi Moe PA-C.    Assessment/Plan   Allergies, medications, history, and pertinent labs/EKGs/Imaging reviewed by Tashi Moe PA-C.     Medical Decision Making  75-year-old male presents urgent care for complaint of urinary frequency/urgency and dysuria for the past 5 days.  States has had " UTI in the past about 12 years ago and states this feels exactly same.  Denies any current fevers or chills, nausea, vomiting, sweats, chest pain, shortness of breath, abdominal pain, gross hematuria, constipation or diarrhea, penile discharge/bleeding, testicular pain, flank pain or back pain.  Denies history or concern for STDs.  Denies recent Vigil catheter placement.  Denies any known drug allergies.  UA shows small blood and glucose.  States he recently had blood work done and states he is not diabetic and declines fingerstick glucose at this time.  Urine culture pending.  Prescribed cephalexin.  Educated on supportive care.  Follow-up PCP 1 to 2 weeks.  Return/ER precautions.  Patient agrees with plan.    Orders and Diagnoses  Diagnoses and all orders for this visit:  Painful urination  -     POCT UA Automated manually resulted      Medical Admin Record      Patient disposition: Home    Electronically signed by Tashi Moe PA-C  4:16 PM

## 2025-02-04 ENCOUNTER — PHARMACY VISIT (OUTPATIENT)
Dept: PHARMACY | Facility: CLINIC | Age: 75
End: 2025-02-04
Payer: COMMERCIAL

## 2025-02-05 LAB — BACTERIA UR CULT: NORMAL

## 2025-04-28 PROCEDURE — RXMED WILLOW AMBULATORY MEDICATION CHARGE

## 2025-04-29 ENCOUNTER — PHARMACY VISIT (OUTPATIENT)
Dept: PHARMACY | Facility: CLINIC | Age: 75
End: 2025-04-29
Payer: COMMERCIAL

## 2025-05-04 PROBLEM — C34.92 SQUAMOUS CELL CARCINOMA OF LEFT LUNG: Status: ACTIVE | Noted: 2025-05-04

## 2025-05-04 PROBLEM — D84.821 IMMUNOSUPPRESSION DUE TO DRUG THERAPY: Status: ACTIVE | Noted: 2025-05-04

## 2025-05-04 PROBLEM — D84.821 IMMUNOSUPPRESSION DUE TO CHRONIC STEROID USE: Status: ACTIVE | Noted: 2025-05-04

## 2025-05-04 PROBLEM — Z79.52 IMMUNOSUPPRESSION DUE TO CHRONIC STEROID USE: Status: ACTIVE | Noted: 2025-05-04

## 2025-05-04 PROBLEM — Z79.899 IMMUNOSUPPRESSION DUE TO DRUG THERAPY: Status: ACTIVE | Noted: 2025-05-04

## 2025-05-04 PROBLEM — T38.0X5A IMMUNOSUPPRESSION DUE TO CHRONIC STEROID USE: Status: ACTIVE | Noted: 2025-05-04

## 2025-05-04 PROBLEM — J96.11 HYPOXEMIC RESPIRATORY FAILURE, CHRONIC: Status: ACTIVE | Noted: 2025-05-04

## 2025-05-05 ENCOUNTER — APPOINTMENT (OUTPATIENT)
Dept: PRIMARY CARE | Facility: CLINIC | Age: 75
End: 2025-05-05
Payer: MEDICARE

## 2025-05-05 VITALS
TEMPERATURE: 97.5 F | SYSTOLIC BLOOD PRESSURE: 126 MMHG | HEIGHT: 73 IN | BODY MASS INDEX: 30.32 KG/M2 | DIASTOLIC BLOOD PRESSURE: 62 MMHG | WEIGHT: 228.8 LBS | HEART RATE: 53 BPM | OXYGEN SATURATION: 99 %

## 2025-05-05 DIAGNOSIS — D84.821 IMMUNOSUPPRESSION DUE TO CHRONIC STEROID USE: ICD-10-CM

## 2025-05-05 DIAGNOSIS — D84.821 IMMUNOSUPPRESSION DUE TO DRUG THERAPY: ICD-10-CM

## 2025-05-05 DIAGNOSIS — J96.11 CHRONIC HYPOXEMIC RESPIRATORY FAILURE: ICD-10-CM

## 2025-05-05 DIAGNOSIS — I42.8 NICM (NONISCHEMIC CARDIOMYOPATHY) (MULTI): ICD-10-CM

## 2025-05-05 DIAGNOSIS — Z79.52 IMMUNOSUPPRESSION DUE TO CHRONIC STEROID USE: ICD-10-CM

## 2025-05-05 DIAGNOSIS — Z79.899 IMMUNOSUPPRESSION DUE TO DRUG THERAPY: ICD-10-CM

## 2025-05-05 DIAGNOSIS — I50.42 CHRONIC COMBINED SYSTOLIC AND DIASTOLIC HEART FAILURE: ICD-10-CM

## 2025-05-05 DIAGNOSIS — T38.0X5A IMMUNOSUPPRESSION DUE TO CHRONIC STEROID USE: ICD-10-CM

## 2025-05-05 DIAGNOSIS — D86.0 PULMONARY SARCOIDOSIS (MULTI): ICD-10-CM

## 2025-05-05 DIAGNOSIS — I27.20 PULMONARY HYPERTENSION (MULTI): ICD-10-CM

## 2025-05-05 DIAGNOSIS — Z00.00 MEDICARE ANNUAL WELLNESS VISIT, SUBSEQUENT: Primary | ICD-10-CM

## 2025-05-05 DIAGNOSIS — D86.85 CARDIAC SARCOIDOSIS: ICD-10-CM

## 2025-05-05 PROCEDURE — 1159F MED LIST DOCD IN RCRD: CPT | Performed by: PHYSICIAN ASSISTANT

## 2025-05-05 PROCEDURE — 1170F FXNL STATUS ASSESSED: CPT | Performed by: PHYSICIAN ASSISTANT

## 2025-05-05 PROCEDURE — 1036F TOBACCO NON-USER: CPT | Performed by: PHYSICIAN ASSISTANT

## 2025-05-05 PROCEDURE — G0439 PPPS, SUBSEQ VISIT: HCPCS | Performed by: PHYSICIAN ASSISTANT

## 2025-05-05 PROCEDURE — 1160F RVW MEDS BY RX/DR IN RCRD: CPT | Performed by: PHYSICIAN ASSISTANT

## 2025-05-05 RX ORDER — PREDNISONE 5 MG/1
5 TABLET ORAL DAILY
COMMUNITY
Start: 2025-01-16

## 2025-05-05 RX ORDER — FUROSEMIDE 40 MG/1
40 TABLET ORAL AS NEEDED
COMMUNITY
Start: 2025-04-22

## 2025-05-05 RX ORDER — POTASSIUM CHLORIDE 750 MG/1
10 TABLET, EXTENDED RELEASE ORAL AS NEEDED
COMMUNITY
Start: 2025-04-22

## 2025-05-05 RX ORDER — ATORVASTATIN CALCIUM 80 MG/1
1 TABLET, FILM COATED ORAL
COMMUNITY
Start: 2025-02-13

## 2025-05-05 RX ORDER — ASPIRIN 81 MG/1
81 TABLET ORAL DAILY
COMMUNITY

## 2025-05-05 RX ORDER — AZATHIOPRINE 50 MG/1
1 TABLET ORAL
COMMUNITY
Start: 2025-04-18

## 2025-05-05 RX ORDER — FLUOROURACIL 50 MG/G
1 CREAM TOPICAL AS NEEDED
COMMUNITY
Start: 2024-10-21

## 2025-05-05 ASSESSMENT — PATIENT HEALTH QUESTIONNAIRE - PHQ9
SUM OF ALL RESPONSES TO PHQ9 QUESTIONS 1 AND 2: 0
2. FEELING DOWN, DEPRESSED OR HOPELESS: NOT AT ALL
1. LITTLE INTEREST OR PLEASURE IN DOING THINGS: NOT AT ALL

## 2025-05-05 ASSESSMENT — ACTIVITIES OF DAILY LIVING (ADL)
DOING_HOUSEWORK: INDEPENDENT
TAKING_MEDICATION: INDEPENDENT
BATHING: INDEPENDENT
DRESSING: INDEPENDENT
GROCERY_SHOPPING: INDEPENDENT
MANAGING_FINANCES: INDEPENDENT

## 2025-05-05 ASSESSMENT — ENCOUNTER SYMPTOMS
DEPRESSION: 0
LOSS OF SENSATION IN FEET: 0
OCCASIONAL FEELINGS OF UNSTEADINESS: 0

## 2025-05-05 NOTE — PROGRESS NOTES
"Subjective   HPI   Abhilash Escamilla is a 75 y.o. year old male patient with presenting to clinic with concern for Medicare Visit     Chief Complaint   Patient presents with    Medicare Annual Wellness Visit Subsequent       HTN  - off losartan  BP Readings from Last 5 Encounters:   05/05/25 126/62   02/03/25 148/75   11/05/24 124/72   09/24/24 103/69   09/16/24 121/77     Component  Ref Range & Units 6 mo ago   RANDOM URINE CREATININE  mg/dL 156.6   MICROALBUMIN URINE, RANDOM  mg/L 5.0   MICROALBUMIN/CREAT RATIO  mg/g Creat 3.2   Resulting Agency [facility]       HLD LDL goal <70  -atorvastatin 80  No results found for: \"LDLCALC\"  LIPID PANEL  Order: [id number]  Component  Ref Range & Units 6 d ago   Cholesterol  <200 mg/dL 211 High    HDL Cholesterol  > OR = 40 mg/dL 61   Triglyceride  <150 mg/dL 216 High    Comment: .  If a non-fasting specimen was collected, consider  repeat triglyceride testing on a fasting specimen  if clinically indicated.  Perfecto et al. J. of Clin. Lipidol. 2015;9:129-169.  .   Low Density Lipoprotein  mg/dL (calc) 116 High    Comment: Reference range: <100  .  Desirable range <100 mg/dL for primary prevention;    <70 mg/dL for patients with CHD or diabetic patients  with > or = 2 CHD risk factors.  .  LDL-C is now calculated using the Lalo-Jessica  calculation, which is a validated novel method providing  better accuracy than the Friedewald equation in the  estimation of LDL-C.  Lalo SS et al. PAYAM. 2013;310(19): 8700-9668  (http://education.[employer].Edhub/faq/XBY275)   CHOLESTEROL/HDL RATIO  <5.0 (calc) 3.5   NON-HDL CHOLESTEROL  <130 mg/dL (calc) 150 High    Comment: For patients with diabetes plus 1 major ASCVD risk  factor, treating to a non-HDL-C goal of <100 mg/dL  (LDL-C of <70 mg/dL) is considered a therapeutic  option.   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     Cardiac Sarcoidosis  PVC, 7% burden  CHF systolic and diastolic  Cardiomyopathy  [facility] Dr Ashley Calixto heart failure  [facility] Dr Jose Martin Solis cardiology  -[facility] 10    Pulmonary Sarcoidosis  -Azathioprine 50mg bid -prednisone 7.5mg every day daily  -Supplemental Oxygen O2 NC qhs  [facility]   [facility] Dr David Gilmore 2nd opinion 1/2025  Squamous cell carcinoma  Hx- transbronchial biopsy diagnosis of sarcoidosis with granulomas at [facility] 25 years ago. Non smoker. Worked in welding aluminum and stainless. He had biopsy-proven squamous cell carcinoma of his left lower lobe that was resected 2023.    GERD  -omeprazole 20mg qam    CKD 3a  Lab Results   Component Value Date    CREATININE 1.29 01/13/2025    BUN 24 (H) 01/13/2025     01/13/2025    K 4.3 01/13/2025     01/13/2025    CO2 24 01/13/2025       Tremor  -primidone 50mg bid    ED  -viagra 100mg PRN    Patient Care Team:  Aileen Downey PA-C as PCP - General     Specialists    Past Medical, Surgical, and Family History reviewed and updated in chart.    Reviewed all medications by prescribing practitioner or clinical pharmacist (such as prescriptions, OTCs, herbal therapies and supplements) and documented in the medical record.     Preventative Health   Health Maintenance Due   Topic Date Due    Hepatitis C Screening  Never done    Pneumococcal Vaccination (3 of 3 - PPSV23, PCV20 or PCV21) 10/31/2017    DTaP/Tdap/Td Vaccines (2 - Td or Tdap) 04/19/2022            Topic Date Due    DTaP/Tdap/Td Vaccines (2 - Td or Tdap) 04/19/2022        Immunizations Reviewed  Immunization History   Administered Date(s) Administered    Flu vaccine, quadrivalent, high-dose, preservative  free, age 65y+ (FLUZONE) 10/25/2021, 10/05/2022    Flu vaccine, quadrivalent, recombinant, preservative free, adult (FLUBLOK) 10/11/2019, 09/22/2023    Flu vaccine, trivalent, preservative free, HIGH-DOSE, age 65y+ (Fluzone) 12/09/2016    Flu vaccine, trivalent, preservative free, no egg protein, age 18y+ (Flublok) 09/10/2024    Influenza, seasonal, injectable 01/11/2014, 10/06/2014, 10/15/2018, 10/15/2020, 11/01/2021, 09/15/2022, 10/05/2022    Moderna COVID-19 vaccine, bivalent, blue cap/gray label *Check age/dose* 10/08/2022    Moderna SARS-CoV-2 Vaccination 02/02/2021, 02/26/2021, 12/13/2021    Pfizer COVID-19 vaccine, bivalent, age 12 years and older (30 mcg/0.3 mL) 10/05/2022    Pneumococcal conjugate vaccine, 13-valent (PREVNAR 13) 12/09/2016, 09/05/2017    Pneumococcal polysaccharide vaccine, 23-valent, age 2 years and older (PNEUMOVAX 23) 02/11/2011    Tdap vaccine, age 7 year and older (BOOSTRIX, ADACEL) 04/19/2012    Zoster vaccine, recombinant, adult (SHINGRIX) 03/12/2020    Zoster, live 10/31/2014        Problem List Reviewed  Problem List[1]    Medical History[2]   Surgical History[3]   Family History[4]   Social History     Tobacco Use    Smoking status: Former     Current packs/day: 1.00     Average packs/day: 1 pack/day for 16.0 years (16.0 ttl pk-yrs)     Types: Cigarettes    Smokeless tobacco: Never    Tobacco comments:     Quit 1990   Substance Use Topics    Alcohol use: Not Currently     Alcohol/week: 5.0 standard drinks of alcohol     Types: 5 Standard drinks or equivalent per week     Comment: a few oz liquor every other day        Medications Reviewed  Medications Ordered Prior to Encounter[5]     Review of Systems  Constitutional: Denies fever  HEENT: Denies ST, earache  CVS: Denies Chest pain  Pulmonary: Denies wheezing, SOB  GI: Denies N/V  : Denies dysuria  Musculoskeletal:  Denies myalgia  Neuro: Denies focal weakness or numbness.  Skin: Denies Rashes.  *Review of Systems is negative  "unless otherwise mentioned in HPI or ROS above.      @OBJECTIVEBEGIN  /62   Pulse 53   Temp 36.4 °C (97.5 °F)   Ht 1.842 m (6' 0.5\")   Wt 104 kg (228 lb 12.8 oz)   SpO2 99%   BMI 30.60 kg/m²  reviewed Body mass index is 30.6 kg/m².     Physical Exam  Constitutional: NAD. Afebrile. Resting comfortably.  ENT: Nasal mucosa and oropharynx: moist oral mucosa. Posterior oropharynx nonerythematous. No posterior pharyngeal streaking.  Eyes: PERRLA. EOM intact. No vertical or circular nystagmus.  Lymph: No anterior cervical chain or submandibular lymphadenopathy. No sentinel lymph nodes.  Cardiac: Regular rate & rhythm. No murmur, gallops, or rubs.  Pulmonary: Lungs clear to auscultation bilaterally with good aeration. No wheezes, rhonchi, or rales. Normal WOB.  GI: Soft, Nontender, nondistended. No guarding. Normal BS x4.  : No suprapubic tenderness. No CVA tenderness to percussion.   Musculoskeletal: No peripheral edema.   Skin: No evidence of trauma. No rashes  Neuro: No focal neuro deficits. Normal gait without assistive devices.  Psych: Intact judgement and insight.    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, T38.0X5A, Z79.52    Immunosuppression due to drug therapy D84.821, Z79.899     Other Visit Diagnoses         Codes      Medicare annual wellness visit, subsequent    -  Primary Z00.00                 [1]   Patient Active Problem List  Diagnosis    Gastroesophageal reflux disease without esophagitis    Mixed hyperlipidemia    Pulmonary sarcoidosis (Multi)    Pulmonary hypertension (Multi)    Chronic combined systolic and " diastolic heart failure    Cardiac sarcoidosis    Benign positional vertigo, right    Benign essential tremor    NICM (nonischemic cardiomyopathy) (Multi)    Squamous cell carcinoma of left lung    Hypoxemic respiratory failure, chronic    Immunosuppression due to chronic steroid use    Immunosuppression due to drug therapy   [2]   Past Medical History:  Diagnosis Date    Genital herpes     S/P TKR (total knee replacement), right 03/31/2021   [3]   Past Surgical History:  Procedure Laterality Date    CHOLECYSTECTOMY      CT LUNG MEDIASTINUM BIOPSY Left 07/22/2022    [place]- unknown result- sarcoidosis vs malignancy    LUNG LOBECTOMY Left     VATS and BRYCE resection 8/2022    TOTAL HIP ARTHROPLASTY      TOTAL KNEE ARTHROPLASTY     [4]   Family History  Problem Relation Name Age of Onset    Breast cancer Parent     [5]   Current Outpatient Medications on File Prior to Visit   Medication Sig Dispense Refill    atorvastatin (Lipitor) 80 mg tablet Take 1 tablet (80 mg) by mouth early in the morning..      azaTHIOprine (Imuran) 50 mg tablet Take 1 tablet (50 mg) by mouth every 12 hours.      empagliflozin (Jardiance) 10 mg tablet Take 1 tablet (10 mg) by mouth once daily. 90 tablet 3    fluorouracil (Efudex) 5 % cream cream Apply 1 Application topically if needed.      furosemide (Lasix) 40 mg tablet Take 1 tablet (40 mg) by mouth if needed.      ibuprofen 200 mg tablet Take 1 tablet (200 mg) by mouth every 6 hours if needed.      meclizine (Antivert) 25 mg tablet Take 1 tablet (25 mg) by mouth 3 times a day as needed.      omeprazole OTC (PriLOSEC OTC) 20 mg EC tablet Take 1 tablet (20 mg) by mouth once daily at bedtime. Do not crush, chew, or split.      oxygen (O2) therapy Inhale 2 L/min at 120,000 mL/hr once daily at bedtime. via nasal canula    Sleep study shows hypoxia 88% overnight without sleep apnea.  PFT no response to bronchodilator.      potassium chloride CR 10 mEq ER tablet Take 1 tablet (10 mEq) by mouth  if needed. With the Lasix      predniSONE (Deltasone) 5 mg tablet Take 1 tablet (5 mg) by mouth once daily.      [DISCONTINUED] atorvastatin (Lipitor) 40 mg tablet Take 1 tablet (40 mg) by mouth once daily at bedtime.      [DISCONTINUED] rosuvastatin (Crestor) 40 mg tablet Take 2 tablets (80 mg) by mouth once daily.      aspirin 81 mg EC tablet Take 1 tablet (81 mg) by mouth once daily.      losartan (Cozaar) 25 mg tablet Take 1 tablet (25 mg) by mouth once daily. (Patient not taking: Reported on 5/5/2025)      [DISCONTINUED] ibuprofen 600 mg tablet Take 1 tablet (600 mg) by mouth 4 times a day.      [DISCONTINUED] omeprazole (PriLOSEC) 20 mg DR capsule 1 capsule (20 mg) once daily in the morning. Take before meals.      [DISCONTINUED] oxygen (O2) gas therapy Inhale continuously.       No current facility-administered medications on file prior to visit.

## 2025-07-28 PROCEDURE — RXMED WILLOW AMBULATORY MEDICATION CHARGE

## 2025-07-29 ENCOUNTER — PHARMACY VISIT (OUTPATIENT)
Dept: PHARMACY | Facility: CLINIC | Age: 75
End: 2025-07-29
Payer: COMMERCIAL

## 2025-11-03 ENCOUNTER — APPOINTMENT (OUTPATIENT)
Dept: PRIMARY CARE | Facility: CLINIC | Age: 75
End: 2025-11-03
Payer: MEDICARE